# Patient Record
Sex: FEMALE | Race: WHITE | Employment: FULL TIME | URBAN - METROPOLITAN AREA
[De-identification: names, ages, dates, MRNs, and addresses within clinical notes are randomized per-mention and may not be internally consistent; named-entity substitution may affect disease eponyms.]

---

## 2016-01-08 LAB — COLONOSCOPY STUDY: NORMAL

## 2017-05-04 ENCOUNTER — OFFICE VISIT (OUTPATIENT)
Dept: FAMILY MEDICINE CLINIC | Facility: CLINIC | Age: 57
End: 2017-05-04

## 2017-05-04 VITALS
DIASTOLIC BLOOD PRESSURE: 80 MMHG | HEART RATE: 65 BPM | HEIGHT: 64.5 IN | RESPIRATION RATE: 16 BRPM | TEMPERATURE: 98 F | WEIGHT: 161.25 LBS | BODY MASS INDEX: 27.19 KG/M2 | SYSTOLIC BLOOD PRESSURE: 120 MMHG | OXYGEN SATURATION: 99 %

## 2017-05-04 DIAGNOSIS — H92.02 OTALGIA, LEFT: ICD-10-CM

## 2017-05-04 DIAGNOSIS — L30.9 DERMATITIS: Primary | ICD-10-CM

## 2017-05-04 PROCEDURE — 99213 OFFICE O/P EST LOW 20 MIN: CPT | Performed by: PHYSICIAN ASSISTANT

## 2017-05-04 NOTE — PROGRESS NOTES
CC:  Patient presents with:  Ear Pain: left, sx for 24 hours   Other: left elbow itchy, for 5-7 days      HPI: Brodie Ellis presents with complaints of left ear pain for the past few days. She denies any recent cold or ear injury.  Her hearing fells muffled in evi bowels; no abdominal pain or N/V/D/C  Genitourinary:  Normal urination; no hematuria, urgency, or frequency  Skin: See HPI    Physical Exam :  /80 mmHg  Pulse 65  Temp(Src) 98.3 °F (36.8 °C) (Oral)  Resp 16  Ht 64.5\"  Wt 161 lb 4 oz  BMI 27.26 kg/m2 complications from the treatments as a result of today.     Reviewed Past Medical History and   Patient Active Problem List:     Degeneration of cervical intervertebral disc     Migraine headache without aura     Nausea & vomiting     Menopause      No orde

## 2017-08-21 ENCOUNTER — TELEPHONE (OUTPATIENT)
Dept: FAMILY MEDICINE CLINIC | Facility: CLINIC | Age: 57
End: 2017-08-21

## 2017-08-21 NOTE — TELEPHONE ENCOUNTER
Pt called asking for a couple of prescriptions and Immunizations. Pt traveling to [facility] on 09/15/17. Pt would like script for Typhoid pills, Abx for travelers diarrhea, and altitude sickness medications.  Then Pt would like to come in for Hep

## 2017-08-21 NOTE — TELEPHONE ENCOUNTER
Pt requesting a call back on questions she has on Immunizations. .traveling to [facility] from Sept 15th thru Sept 24th (Questions on Thyfoid,Hep A, Altitude med she does want and Blocking Diahrrea med??)

## 2017-08-22 NOTE — TELEPHONE ENCOUNTER
Patient has not been seen by me in over 1 year. She will need a travel consult visit to discuss vaccines, and medication requests. Please schedule visit.

## 2017-08-22 NOTE — TELEPHONE ENCOUNTER
Patient wants to know if she can get the Typhoid and Hep A on 8/29/17 will that work in the time frame needed? She is desperate to have this done. She keeps hearing that it has to be done 2 weeks out.

## 2017-08-22 NOTE — TELEPHONE ENCOUNTER
Typhoid is 4 doses, 1 capsule every other day to be completed 1 week prior to travel. Hepatitis A is a 2 shot series 6 months apart. She will only be able to get the 1st dose before her travel.

## 2017-08-22 NOTE — TELEPHONE ENCOUNTER
TT patient and she stated that she will check her phone schedule and call back to schedule an appt. preferably with TOMMY Lopez. She needs appt prior to 9/15/17.

## 2017-08-23 NOTE — TELEPHONE ENCOUNTER
Called and talked to patient informed her about getting RX for oral typhoid and that she can get Hep A at 8/29 visit

## 2017-08-29 ENCOUNTER — OFFICE VISIT (OUTPATIENT)
Dept: FAMILY MEDICINE CLINIC | Facility: CLINIC | Age: 57
End: 2017-08-29

## 2017-08-29 VITALS
WEIGHT: 158.19 LBS | BODY MASS INDEX: 25.73 KG/M2 | HEART RATE: 64 BPM | RESPIRATION RATE: 14 BRPM | SYSTOLIC BLOOD PRESSURE: 120 MMHG | HEIGHT: 65.6 IN | DIASTOLIC BLOOD PRESSURE: 86 MMHG | TEMPERATURE: 98 F

## 2017-08-29 DIAGNOSIS — Z23 IMMUNIZATION DUE: ICD-10-CM

## 2017-08-29 DIAGNOSIS — Z71.84 COUNSELING ABOUT TRAVEL: ICD-10-CM

## 2017-08-29 DIAGNOSIS — G43.009 MIGRAINE WITHOUT AURA AND WITHOUT STATUS MIGRAINOSUS, NOT INTRACTABLE: Primary | ICD-10-CM

## 2017-08-29 PROCEDURE — 99213 OFFICE O/P EST LOW 20 MIN: CPT | Performed by: NURSE PRACTITIONER

## 2017-08-29 PROCEDURE — 90632 HEPA VACCINE ADULT IM: CPT | Performed by: NURSE PRACTITIONER

## 2017-08-29 PROCEDURE — 90471 IMMUNIZATION ADMIN: CPT | Performed by: NURSE PRACTITIONER

## 2017-08-29 NOTE — PATIENT INSTRUCTIONS
Have a great trip! Return in 6 months for Hepatitis A #2 dose to complete series. Recommend annual flu vaccine.

## 2017-08-29 NOTE — PROGRESS NOTES
Dirk Guerra is a 49year old female. S:  Patient presents today with the following concerns:  · Trip to [facility], leaving in about 2 weeks. Needs immunizations updated. Denies any recent fever, chills or acute illness. Looking forward to vacation.  Request series. Recommend annual flu vaccine. Medication use and side effects reviewed. Return in about 6 months (around 2/28/2018) for Hepatitis A #2 due, RN visit. Patient verbalizes understanding.

## 2018-01-03 ENCOUNTER — TELEPHONE (OUTPATIENT)
Dept: FAMILY MEDICINE CLINIC | Facility: CLINIC | Age: 58
End: 2018-01-03

## 2018-01-03 DIAGNOSIS — Z12.39 BREAST CANCER SCREENING: Primary | ICD-10-CM

## 2018-01-03 NOTE — TELEPHONE ENCOUNTER
Patient is requesting a  Mammogram order to be placed at [facility] in [place], [place]

## 2018-01-03 NOTE — TELEPHONE ENCOUNTER
We have no HX of ordering mammograms for this patient and she has only seen Nicole Haroldo SANDERS will ask Dr Elisabeth Veliz if [place] for bilateral screening mammogram it will need to be Faxed to [facility], [address]  [phone]  (

## 2018-01-15 ENCOUNTER — TELEPHONE (OUTPATIENT)
Dept: FAMILY MEDICINE CLINIC | Facility: CLINIC | Age: 58
End: 2018-01-15

## 2018-01-15 ENCOUNTER — OFFICE VISIT (OUTPATIENT)
Dept: FAMILY MEDICINE CLINIC | Facility: CLINIC | Age: 58
End: 2018-01-15

## 2018-01-15 VITALS
HEART RATE: 60 BPM | DIASTOLIC BLOOD PRESSURE: 72 MMHG | WEIGHT: 156 LBS | BODY MASS INDEX: 25.68 KG/M2 | TEMPERATURE: 98 F | HEIGHT: 65.5 IN | SYSTOLIC BLOOD PRESSURE: 110 MMHG | RESPIRATION RATE: 12 BRPM

## 2018-01-15 DIAGNOSIS — R14.0 BLOATING: ICD-10-CM

## 2018-01-15 DIAGNOSIS — R10.2 PELVIC PAIN: ICD-10-CM

## 2018-01-15 DIAGNOSIS — R42 LIGHT HEADED: ICD-10-CM

## 2018-01-15 DIAGNOSIS — M62.830 BACK MUSCLE SPASM: Primary | ICD-10-CM

## 2018-01-15 PROCEDURE — 99214 OFFICE O/P EST MOD 30 MIN: CPT | Performed by: FAMILY MEDICINE

## 2018-01-15 NOTE — PROGRESS NOTES
Patient presents with:  Low Back Pain: on and off   Dizziness (neurologic)     HPI:   Erik Mccullough is a 56year old female who presents to the office for back issues. Also yesterday had a bout of lightheadedness. This has since resolved.       Does iron CHONDR 1500 COMPLX OR) Take by mouth 3 (three) times daily. Disp:  Rfl:    Multiple Vitamin (DAILY VALUE MULTIVITAMIN) Oral Tab Take 1 tablet by mouth daily. Disp:  Rfl:      No current facility-administered medications on file prior to visit.      Allergie wheezes, rales or rhonchi, symmetric air entry  Heart - normal rate, regular rhythm, normal S1, S2, no murmurs, rubs, clicks or gallops  Abdomen - soft, nontender, nondistended, no masses or organomegaly  No pains or masses in lower abdominal pelvic area. cause.  Mild vague pains periodically, bloating sensation, h/o fibroid and cyst on ovary. Further workup warranted  Will get imaging.   - US PELVIS (TRANSVAGINAL PELVIS) (CPT=76830); Future    3. Bloating  As above.    - US PELVIS (TRANSVAGINAL PELVIS) (CP

## 2018-01-15 NOTE — TELEPHONE ENCOUNTER
Much better today with light headedness was really restless at night not dizzy but felt light headed today feeling much better bit not 100% has back pain on and off for a while now no HX of injury to back but very active and may have pulled something while

## 2018-01-15 NOTE — TELEPHONE ENCOUNTER
Patient called to make appt today and we did with Candy Velásquez PA-C at 2:40 pm she is coming in for lower back pain today and lightheadedness all day yesterday.   Please call

## 2018-01-15 NOTE — TELEPHONE ENCOUNTER
LM on both phones that her appt was double booked with Aurea Jackson PA-C and we need to change it to  at 2:15 pm.  Please call if unable to make appt.

## 2018-01-25 ENCOUNTER — HOSPITAL ENCOUNTER (OUTPATIENT)
Dept: ULTRASOUND IMAGING | Facility: HOSPITAL | Age: 58
Discharge: HOME | End: 2018-01-25
Attending: FAMILY MEDICINE
Payer: COMMERCIAL

## 2018-01-25 ENCOUNTER — HOSPITAL ENCOUNTER (OUTPATIENT)
Dept: MAMMOGRAPHY | Facility: HOSPITAL | Age: 58
Discharge: HOME | End: 2018-01-25
Attending: FAMILY MEDICINE
Payer: COMMERCIAL

## 2018-01-25 DIAGNOSIS — Z12.39 BREAST CANCER SCREENING: ICD-10-CM

## 2018-01-25 DIAGNOSIS — R14.0 BLOATING: ICD-10-CM

## 2018-01-25 DIAGNOSIS — R10.2 PELVIC PAIN: ICD-10-CM

## 2018-01-25 PROCEDURE — 76856 US EXAM PELVIC COMPLETE: CPT | Performed by: FAMILY MEDICINE

## 2018-01-25 PROCEDURE — 77067 SCR MAMMO BI INCL CAD: CPT | Performed by: FAMILY MEDICINE

## 2018-01-25 PROCEDURE — 76830 TRANSVAGINAL US NON-OB: CPT | Performed by: FAMILY MEDICINE

## 2018-01-27 ENCOUNTER — PATIENT MESSAGE (OUTPATIENT)
Dept: FAMILY MEDICINE CLINIC | Facility: CLINIC | Age: 58
End: 2018-01-27

## 2018-01-29 NOTE — TELEPHONE ENCOUNTER
From: Cristy Murray  To: Macey Lozada MD  Sent: 1/27/2018 4:14 PM CST  Subject: Other    Dr. Geovany Little,    This is a follow up to the message I sent you this morning.  I sent the same message to my OB/GYN DrThelma (Dr. Abby Maldonado with [address]

## 2018-02-12 ENCOUNTER — CHARTING TRANS (OUTPATIENT)
Dept: OTHER | Age: 58
End: 2018-02-12

## 2018-02-12 ASSESSMENT — PAIN SCALES - GENERAL: PAINLEVEL_OUTOF10: 2

## 2018-10-09 ENCOUNTER — TELEPHONE (OUTPATIENT)
Dept: FAMILY MEDICINE CLINIC | Facility: CLINIC | Age: 58
End: 2018-10-09

## 2018-10-09 NOTE — TELEPHONE ENCOUNTER
Medical Records Release received from pt requesting all of her Medical Records except physical form 5/13/106 faxed to Dr Charli Anderson December at [phone] . All Medical Records located in [facility] in which request was sent to [address] [place] [address] STAT.  Pt requesting to pick u

## 2018-10-30 ENCOUNTER — CHARTING TRANS (OUTPATIENT)
Dept: OTHER | Age: 58
End: 2018-10-30

## 2018-11-14 ENCOUNTER — LAB SERVICES (OUTPATIENT)
Dept: OTHER | Age: 58
End: 2018-11-14

## 2018-11-14 LAB
ALBUMIN SERPL-MCNC: 4 G/DL (ref 3.6–5.1)
ALBUMIN/GLOB SERPL: 1.4 {RATIO} (ref 1–2.4)
ALP SERPL-CCNC: 79 UNITS/L (ref 45–117)
ALT SERPL-CCNC: 22 UNITS/L
ANION GAP SERPL CALC-SCNC: 10 MMOL/L (ref 10–20)
AST SERPL-CCNC: 10 UNITS/L
BASOPHILS # BLD: 0.1 K/MCL (ref 0–0.3)
BASOPHILS NFR BLD: 1 %
BILIRUB SERPL-MCNC: 0.6 MG/DL (ref 0.2–1)
BUN SERPL-MCNC: 20 MG/DL (ref 6–20)
BUN/CREAT SERPL: 25 (ref 7–25)
CALCIUM SERPL-MCNC: 9.5 MG/DL (ref 8.4–10.2)
CHLORIDE SERPL-SCNC: 108 MMOL/L (ref 98–107)
CHOLEST SERPL-MCNC: 176 MG/DL
CHOLEST/HDLC SERPL: 2.8 {RATIO}
CO2 SERPL-SCNC: 30 MMOL/L (ref 21–32)
CREAT SERPL-MCNC: 0.8 MG/DL (ref 0.51–0.95)
DIFFERENTIAL METHOD BLD: NORMAL
EOSINOPHIL # BLD: 0.1 K/MCL (ref 0.1–0.5)
EOSINOPHIL NFR BLD: 2 %
ERYTHROCYTE [DISTWIDTH] IN BLOOD: 13.2 % (ref 11–15)
GLOBULIN SER-MCNC: 2.8 G/DL (ref 2–4)
GLUCOSE SERPL-MCNC: 75 MG/DL (ref 65–99)
HCT VFR BLD CALC: 40.5 % (ref 36–46.5)
HDLC SERPL-MCNC: 63 MG/DL
HGB BLD-MCNC: 13.2 G/DL (ref 12–15.5)
IMM GRANULOCYTES # BLD AUTO: 0 K/MCL (ref 0–0.2)
IMM GRANULOCYTES NFR BLD: 0 %
LDLC SERPL CALC-MCNC: 98 MG/DL
LENGTH OF FAST TIME PATIENT: ABNORMAL HRS
LENGTH OF FAST TIME PATIENT: ABNORMAL HRS
LYMPHOCYTES # BLD: 1.7 K/MCL (ref 1–4)
LYMPHOCYTES NFR BLD: 36 %
MCH RBC QN AUTO: 30.4 PG (ref 26–34)
MCHC RBC AUTO-ENTMCNC: 32.6 G/DL (ref 32–36.5)
MCV RBC AUTO: 93.3 FL (ref 78–100)
MONOCYTES # BLD: 0.4 K/MCL (ref 0.3–0.9)
MONOCYTES NFR BLD: 9 %
NEUTROPHILS # BLD: 2.4 K/MCL (ref 1.8–7.7)
NEUTROPHILS NFR BLD: 52 %
NONHDLC SERPL-MCNC: 113 MG/DL
NRBC (NRBCRE): 0 /100 WBC
PLATELET # BLD: 172 K/MCL (ref 140–450)
POTASSIUM SERPL-SCNC: 4.5 MMOL/L (ref 3.4–5.1)
PROT SERPL-MCNC: 6.8 G/DL (ref 6.4–8.2)
RBC # BLD: 4.34 MIL/MCL (ref 4–5.2)
SODIUM SERPL-SCNC: 143 MMOL/L (ref 135–145)
TRIGL SERPL-MCNC: 73 MG/DL
TSH SERPL-ACNC: 2.41 MCUNITS/ML (ref 0.35–5)
WBC # BLD: 4.6 K/MCL (ref 4.2–11)

## 2018-11-15 ENCOUNTER — CHARTING TRANS (OUTPATIENT)
Dept: OTHER | Age: 58
End: 2018-11-15

## 2018-11-27 VITALS
WEIGHT: 155.4 LBS | SYSTOLIC BLOOD PRESSURE: 138 MMHG | BODY MASS INDEX: 24.98 KG/M2 | DIASTOLIC BLOOD PRESSURE: 80 MMHG | HEIGHT: 66 IN | HEART RATE: 45 BPM

## 2018-12-01 ENCOUNTER — PRIOR ORIGINAL RECORDS (OUTPATIENT)
Dept: HEALTH INFORMATION MANAGEMENT | Facility: OTHER | Age: 58
End: 2018-12-01

## 2019-01-07 ENCOUNTER — APPOINTMENT (OUTPATIENT)
Dept: GENERAL RADIOLOGY | Facility: HOSPITAL | Age: 59
End: 2019-01-07
Attending: EMERGENCY MEDICINE
Payer: COMMERCIAL

## 2019-01-07 ENCOUNTER — HOSPITAL ENCOUNTER (OUTPATIENT)
Facility: HOSPITAL | Age: 59
Setting detail: OBSERVATION
Discharge: HOME | End: 2019-01-08
Attending: EMERGENCY MEDICINE | Admitting: STUDENT IN AN ORGANIZED HEALTH CARE EDUCATION/TRAINING PROGRAM
Payer: COMMERCIAL

## 2019-01-07 ENCOUNTER — TELEPHONE (OUTPATIENT)
Dept: SCHEDULING | Age: 59
End: 2019-01-07

## 2019-01-07 DIAGNOSIS — M54.12 CERVICAL RADICULOPATHY: ICD-10-CM

## 2019-01-07 DIAGNOSIS — S29.019A STRAIN OF THORACIC REGION, INITIAL ENCOUNTER: ICD-10-CM

## 2019-01-07 DIAGNOSIS — S16.1XXA STRAIN OF NECK MUSCLE, INITIAL ENCOUNTER: Primary | ICD-10-CM

## 2019-01-07 LAB
ALBUMIN SERPL-MCNC: 4.1 G/DL (ref 3.1–4.5)
ALBUMIN/GLOB SERPL: 1.1 {RATIO} (ref 1–2)
ALP LIVER SERPL-CCNC: 85 U/L (ref 46–118)
ALT SERPL-CCNC: 22 U/L (ref 14–54)
ANION GAP SERPL CALC-SCNC: 4 MMOL/L (ref 0–18)
AST SERPL-CCNC: 26 U/L (ref 15–41)
BASOPHILS # BLD AUTO: 0.04 X10(3) UL (ref 0–0.1)
BASOPHILS NFR BLD AUTO: 0.4 %
BILIRUB SERPL-MCNC: 0.7 MG/DL (ref 0.1–2)
BUN BLD-MCNC: 12 MG/DL (ref 8–20)
BUN/CREAT SERPL: 15 (ref 10–20)
CALCIUM BLD-MCNC: 10.4 MG/DL (ref 8.3–10.3)
CHLORIDE SERPL-SCNC: 109 MMOL/L (ref 101–111)
CO2 SERPL-SCNC: 26 MMOL/L (ref 22–32)
CREAT BLD-MCNC: 0.8 MG/DL (ref 0.55–1.02)
EOSINOPHIL # BLD AUTO: 0.03 X10(3) UL (ref 0–0.3)
EOSINOPHIL NFR BLD AUTO: 0.3 %
ERYTHROCYTE [DISTWIDTH] IN BLOOD BY AUTOMATED COUNT: 12.7 % (ref 11.5–16)
GLOBULIN PLAS-MCNC: 3.6 G/DL (ref 2.8–4.4)
GLUCOSE BLD-MCNC: 99 MG/DL (ref 70–99)
HCT VFR BLD AUTO: 43.1 % (ref 34–50)
HGB BLD-MCNC: 14.9 G/DL (ref 12–16)
IMMATURE GRANULOCYTE COUNT: 0.03 X10(3) UL (ref 0–1)
IMMATURE GRANULOCYTE RATIO %: 0.3 %
LYMPHOCYTES # BLD AUTO: 1.01 X10(3) UL (ref 0.9–4)
LYMPHOCYTES NFR BLD AUTO: 9.9 %
M PROTEIN MFR SERPL ELPH: 7.7 G/DL (ref 6.4–8.2)
MCH RBC QN AUTO: 30.9 PG (ref 27–33.2)
MCHC RBC AUTO-ENTMCNC: 34.6 G/DL (ref 31–37)
MCV RBC AUTO: 89.4 FL (ref 81–100)
MONOCYTES # BLD AUTO: 0.63 X10(3) UL (ref 0.1–1)
MONOCYTES NFR BLD AUTO: 6.2 %
NEUTROPHIL ABS PRELIM: 8.46 X10 (3) UL (ref 1.3–6.7)
NEUTROPHILS # BLD AUTO: 8.46 X10(3) UL (ref 1.3–6.7)
NEUTROPHILS NFR BLD AUTO: 82.9 %
OSMOLALITY SERPL CALC.SUM OF ELEC: 288 MOSM/KG (ref 275–295)
PLATELET # BLD AUTO: 183 10(3)UL (ref 150–450)
POTASSIUM SERPL-SCNC: 5.1 MMOL/L (ref 3.6–5.1)
RBC # BLD AUTO: 4.82 X10(6)UL (ref 3.8–5.1)
RED CELL DISTRIBUTION WIDTH-SD: 41.8 FL (ref 35.1–46.3)
SODIUM SERPL-SCNC: 139 MMOL/L (ref 136–144)
WBC # BLD AUTO: 10.2 X10(3) UL (ref 4–13)

## 2019-01-07 PROCEDURE — 72040 X-RAY EXAM NECK SPINE 2-3 VW: CPT | Performed by: EMERGENCY MEDICINE

## 2019-01-07 PROCEDURE — 99219 INITIAL OBSERVATION CARE,LEVL II: CPT | Performed by: STUDENT IN AN ORGANIZED HEALTH CARE EDUCATION/TRAINING PROGRAM

## 2019-01-07 PROCEDURE — 72072 X-RAY EXAM THORAC SPINE 3VWS: CPT | Performed by: EMERGENCY MEDICINE

## 2019-01-07 RX ORDER — ACETAMINOPHEN 325 MG/1
650 TABLET ORAL EVERY 6 HOURS PRN
Status: DISCONTINUED | OUTPATIENT
Start: 2019-01-07 | End: 2019-01-08

## 2019-01-07 RX ORDER — TRAMADOL HYDROCHLORIDE 50 MG/1
50 TABLET ORAL EVERY 6 HOURS PRN
Status: DISCONTINUED | OUTPATIENT
Start: 2019-01-07 | End: 2019-01-08

## 2019-01-07 RX ORDER — HYDROMORPHONE HYDROCHLORIDE 1 MG/ML
0.5 INJECTION, SOLUTION INTRAMUSCULAR; INTRAVENOUS; SUBCUTANEOUS EVERY 30 MIN PRN
Status: DISCONTINUED | OUTPATIENT
Start: 2019-01-07 | End: 2019-01-07 | Stop reason: HOSPADM

## 2019-01-07 RX ORDER — POLYETHYLENE GLYCOL 3350 17 G/17G
17 POWDER, FOR SOLUTION ORAL DAILY PRN
Status: DISCONTINUED | OUTPATIENT
Start: 2019-01-07 | End: 2019-01-08

## 2019-01-07 RX ORDER — CYCLOBENZAPRINE HCL 5 MG
5 TABLET ORAL 3 TIMES DAILY PRN
Status: DISCONTINUED | OUTPATIENT
Start: 2019-01-07 | End: 2019-01-08 | Stop reason: DRUGHIGH

## 2019-01-07 RX ORDER — METHYLPREDNISOLONE 4 MG/1
12 TABLET ORAL
Status: COMPLETED | OUTPATIENT
Start: 2019-01-07 | End: 2019-01-07

## 2019-01-07 RX ORDER — METHYLPREDNISOLONE 4 MG/1
4 TABLET ORAL 2 TIMES DAILY
Status: DISCONTINUED | OUTPATIENT
Start: 2019-01-11 | End: 2019-01-08

## 2019-01-07 RX ORDER — SODIUM CHLORIDE 9 MG/ML
125 INJECTION, SOLUTION INTRAVENOUS CONTINUOUS
Status: DISCONTINUED | OUTPATIENT
Start: 2019-01-07 | End: 2019-01-07

## 2019-01-07 RX ORDER — METHYLPREDNISOLONE 4 MG/1
4 TABLET ORAL
Status: DISCONTINUED | OUTPATIENT
Start: 2019-01-09 | End: 2019-01-08

## 2019-01-07 RX ORDER — METHYLPREDNISOLONE 4 MG/1
4 TABLET ORAL
Status: DISCONTINUED | OUTPATIENT
Start: 2019-01-10 | End: 2019-01-08

## 2019-01-07 RX ORDER — DEXAMETHASONE SODIUM PHOSPHATE 4 MG/ML
10 VIAL (ML) INJECTION ONCE
Status: COMPLETED | OUTPATIENT
Start: 2019-01-07 | End: 2019-01-07

## 2019-01-07 RX ORDER — SODIUM CHLORIDE 9 MG/ML
INJECTION, SOLUTION INTRAVENOUS CONTINUOUS
Status: DISCONTINUED | OUTPATIENT
Start: 2019-01-07 | End: 2019-01-08

## 2019-01-07 RX ORDER — BISACODYL 10 MG
10 SUPPOSITORY, RECTAL RECTAL
Status: DISCONTINUED | OUTPATIENT
Start: 2019-01-07 | End: 2019-01-08

## 2019-01-07 RX ORDER — METHYLPREDNISOLONE 4 MG/1
4 TABLET ORAL
Status: DISCONTINUED | OUTPATIENT
Start: 2019-01-12 | End: 2019-01-08

## 2019-01-07 RX ORDER — MORPHINE SULFATE 4 MG/ML
1 INJECTION, SOLUTION INTRAMUSCULAR; INTRAVENOUS EVERY 4 HOURS PRN
Status: DISCONTINUED | OUTPATIENT
Start: 2019-01-07 | End: 2019-01-08

## 2019-01-07 RX ORDER — DIAZEPAM 5 MG/ML
5 INJECTION, SOLUTION INTRAMUSCULAR; INTRAVENOUS ONCE
Status: COMPLETED | OUTPATIENT
Start: 2019-01-07 | End: 2019-01-07

## 2019-01-07 RX ORDER — SODIUM PHOSPHATE, DIBASIC AND SODIUM PHOSPHATE, MONOBASIC 7; 19 G/133ML; G/133ML
1 ENEMA RECTAL ONCE AS NEEDED
Status: DISCONTINUED | OUTPATIENT
Start: 2019-01-07 | End: 2019-01-08

## 2019-01-07 RX ORDER — METHYLPREDNISOLONE 4 MG/1
TABLET ORAL
Qty: 1 PACKAGE | Refills: 0 | Status: SHIPPED | OUTPATIENT
Start: 2019-01-07 | End: 2019-01-08

## 2019-01-07 RX ORDER — TRAMADOL HYDROCHLORIDE 50 MG/1
TABLET ORAL EVERY 4 HOURS PRN
Qty: 10 TABLET | Refills: 0 | Status: SHIPPED | OUTPATIENT
Start: 2019-01-07 | End: 2019-01-08

## 2019-01-07 RX ORDER — TRAMADOL HYDROCHLORIDE 50 MG/1
TABLET ORAL EVERY 6 HOURS PRN
Status: DISCONTINUED | OUTPATIENT
Start: 2019-01-07 | End: 2019-01-07 | Stop reason: SDUPTHER

## 2019-01-07 RX ORDER — ENOXAPARIN SODIUM 100 MG/ML
40 INJECTION SUBCUTANEOUS NIGHTLY
Status: DISCONTINUED | OUTPATIENT
Start: 2019-01-07 | End: 2019-01-08

## 2019-01-07 RX ORDER — TRAMADOL HYDROCHLORIDE 50 MG/1
100 TABLET ORAL EVERY 6 HOURS PRN
Status: DISCONTINUED | OUTPATIENT
Start: 2019-01-07 | End: 2019-01-08

## 2019-01-07 RX ORDER — NAPROXEN 500 MG/1
500 TABLET ORAL 2 TIMES DAILY PRN
Qty: 20 TABLET | Refills: 0 | Status: SHIPPED | OUTPATIENT
Start: 2019-01-07 | End: 2019-01-08

## 2019-01-07 RX ORDER — ONDANSETRON 2 MG/ML
4 INJECTION INTRAMUSCULAR; INTRAVENOUS EVERY 6 HOURS PRN
Status: DISCONTINUED | OUTPATIENT
Start: 2019-01-07 | End: 2019-01-08

## 2019-01-07 RX ORDER — METOCLOPRAMIDE HYDROCHLORIDE 5 MG/ML
10 INJECTION INTRAMUSCULAR; INTRAVENOUS EVERY 8 HOURS PRN
Status: DISCONTINUED | OUTPATIENT
Start: 2019-01-07 | End: 2019-01-08

## 2019-01-07 RX ORDER — ONDANSETRON 2 MG/ML
4 INJECTION INTRAMUSCULAR; INTRAVENOUS ONCE
Status: DISCONTINUED | OUTPATIENT
Start: 2019-01-07 | End: 2019-01-08

## 2019-01-07 RX ORDER — METHYLPREDNISOLONE 4 MG/1
4 TABLET ORAL
Status: DISCONTINUED | OUTPATIENT
Start: 2019-01-08 | End: 2019-01-08

## 2019-01-07 RX ORDER — MUSCLE RUB CREAM 100; 150 MG/G; MG/G
CREAM TOPICAL 3 TIMES DAILY PRN
Status: DISCONTINUED | OUTPATIENT
Start: 2019-01-07 | End: 2019-01-08

## 2019-01-07 RX ORDER — SODIUM CHLORIDE 9 MG/ML
125 INJECTION, SOLUTION INTRAVENOUS CONTINUOUS
Status: DISCONTINUED | OUTPATIENT
Start: 2019-01-07 | End: 2019-01-08

## 2019-01-07 RX ORDER — ASPIRIN 81 MG/1
TABLET, CHEWABLE ORAL
Status: DISPENSED
Start: 2019-01-07 | End: 2019-01-08

## 2019-01-07 RX ORDER — DIAZEPAM 5 MG/1
5 TABLET ORAL 3 TIMES DAILY PRN
Qty: 20 TABLET | Refills: 0 | Status: SHIPPED | OUTPATIENT
Start: 2019-01-07 | End: 2019-01-08

## 2019-01-07 RX ORDER — KETOROLAC TROMETHAMINE 30 MG/ML
30 INJECTION, SOLUTION INTRAMUSCULAR; INTRAVENOUS ONCE
Status: COMPLETED | OUTPATIENT
Start: 2019-01-07 | End: 2019-01-07

## 2019-01-07 RX ORDER — METHYLPREDNISOLONE 4 MG/1
8 TABLET ORAL
Status: DISCONTINUED | OUTPATIENT
Start: 2019-01-08 | End: 2019-01-08

## 2019-01-07 NOTE — ED NOTES
Patient sat up, reported feeling really dizzy and stated \"I can't do this\" patient laid back down and reported feeling nauseas. Patient did have yellow colored emesis. MD notified. Zofran ordered for patient.

## 2019-01-07 NOTE — ED NOTES
Patient remains updated with results and plan of care.  at bedside and also provided with update. Patient given additional cold packs at this time and ace wrap to help wrap around left shoulder. Lying on side, reports pain decreasing slightly.  No ac

## 2019-01-07 NOTE — ED NOTES
Report called to Dory Medina RN at this time. Patient observed laying on side with  and daughters at bedside. Patient to be transported by Angelique Myles PCT. No distress observed. Cold packs in place. Belongings with . VSS.  Pain unchanged at rest, repor

## 2019-01-07 NOTE — ED INITIAL ASSESSMENT (HPI)
PT c/o left shoulder pain that radiates to back and down left arm that has been increasing in intensity for past 48 hours. PT denies any falls or traumas. Pt rpt she ran a race in Sept and began to notice it at that time only mild.  PT received 4-baby ASA e

## 2019-01-07 NOTE — ED PROVIDER NOTES
Patient Seen in: [facility] Emergency Department    History   Patient presents with:  Upper Extremity Injury (musculoskeletal)    Stated Complaint: Left Shoulder Pain    HPI    16-year-old female presents to the emergency department with complaints All other systems reviewed and negative except as noted above.     Physical Exam     ED Triage Vitals [01/07/19 1208]   /69   Pulse 64   Resp 15   Temp 98.3 °F (36.8 °C)   Temp src Temporal   SpO2 99 %   O2 Device None (Room air)       Current:/ The following orders were created for panel order CBC WITH DIFFERENTIAL WITH PLATELET.   Procedure                               Abnormality         Status                     ---------                               -----------         [facility] [id number] [phone]  Call  choose option 2 for neurosurgery or pain follow up        Medications Prescribed:  Current Discharge Medication List    START taking these medications    methylPREDNISolone 4 MG Oral Tablet Therapy Pack

## 2019-01-07 NOTE — ED NOTES
Patient has returned from xray at this time. Informed by radiology staff that she was unable to tolerate position for testing. MD notified of this. Additional meds to be ordered. Patient has cold pack in place to left shoulder blade area.

## 2019-01-07 NOTE — ED NOTES
Family requesting more ice packs, more ice packs provided. Spoke with patient and family. Per patient she is not quite ready to try to sit up yet at this time. Offered prn pain medication and offered to assist patient to sit up.  Patient expressed she will

## 2019-01-08 VITALS
HEIGHT: 66 IN | OXYGEN SATURATION: 100 % | SYSTOLIC BLOOD PRESSURE: 161 MMHG | TEMPERATURE: 99 F | WEIGHT: 153 LBS | HEART RATE: 61 BPM | DIASTOLIC BLOOD PRESSURE: 95 MMHG | BODY MASS INDEX: 24.59 KG/M2 | RESPIRATION RATE: 18 BRPM

## 2019-01-08 PROCEDURE — 99217 OBSERVATION CARE DISCHARGE: CPT | Performed by: HOSPITALIST

## 2019-01-08 RX ORDER — NAPROXEN 500 MG/1
500 TABLET ORAL 2 TIMES DAILY PRN
Qty: 20 TABLET | Refills: 0 | Status: SHIPPED | OUTPATIENT
Start: 2019-01-08

## 2019-01-08 RX ORDER — CYCLOBENZAPRINE HCL 10 MG
10 TABLET ORAL 3 TIMES DAILY PRN
Status: DISCONTINUED | OUTPATIENT
Start: 2019-01-08 | End: 2019-01-08

## 2019-01-08 RX ORDER — CYCLOBENZAPRINE HCL 10 MG
10 TABLET ORAL 3 TIMES DAILY PRN
Qty: 15 TABLET | Refills: 0 | Status: SHIPPED | OUTPATIENT
Start: 2019-01-08

## 2019-01-08 RX ORDER — NAPROXEN 500 MG/1
500 TABLET ORAL EVERY 12 HOURS PRN
Status: DISCONTINUED | OUTPATIENT
Start: 2019-01-08 | End: 2019-01-08

## 2019-01-08 RX ORDER — METHYLPREDNISOLONE 4 MG/1
TABLET ORAL
Qty: 1 PACKAGE | Refills: 0 | Status: SHIPPED | OUTPATIENT
Start: 2019-01-08 | End: 2019-01-13

## 2019-01-08 NOTE — PLAN OF CARE
PAIN - ADULT    • Verbalizes/displays adequate comfort level or patient's stated pain goal Progressing        Patient/Family Goals    • Patient/Family Long Term Goal Progressing    • Patient/Family Short Term Goal Progressing        PT.  STARTED ON MEDROL D

## 2019-01-08 NOTE — PHYSICAL THERAPY NOTE
PHYSICAL THERAPY QUICK EVALUATION - INPATIENT    Room Number: 364/364-A  Evaluation Date: 1/8/2019  Presenting Problem: (shoulder pain)  Physician Order: PT Eval and Treat    Problem List  Principal Problem:    Strain of neck muscle, initial encounter  A help from another person does the patient currently need. ..   -   Moving to and from a bed to a chair (including a wheelchair)?: None   -   Need to walk in hospital room?: None   -   Climbing 3-5 steps with a railing?: None       AM-PAC Score:  Raw Score: able to transfer Safely and independently   Patient able to ambulate on level surfaces Safely and independently

## 2019-01-08 NOTE — PROGRESS NOTES
[facility] HOSPITAL[facility]  DISCHARGE SUMMARY     Prince Moeller Patient Status:  Observation    1960 MRN [id number]   [facility] 3SW-A Attending Rodriguez Atkins MD   Hosp Day # 0 PCP Kathy Malone MD     Date of Admission: 2019  Date =====  CONCLUSION:  Scoliotic and degenerative changes. FINDINGS:    There are 12 rib-bearing thoracic vertebral bodies. There is mild apex right low thoracic curvature. There is straightening of the expected kyphosis. No subluxation.   [facility] [phone]  Call  choose option 2 for neurosurgery or pain follow up    Appointments for Next 30 Days 1/8/2019 - 2/7/2019    None          Vital signs:  Temp:  [97.5 °F (36.4 °C)-98

## 2019-01-08 NOTE — PLAN OF CARE
PAIN - ADULT    • Verbalizes/displays adequate comfort level or patient's stated pain goal Progressing        Patient/Family Goals    • Patient/Family Short Term Goal Progressing        A/o x4. Denies numbness, tingling. Moves all extremities.   States le

## 2019-01-08 NOTE — PROGRESS NOTES
ASSUMED PT.S CARE. VSS. PT.S FRIEND AT BEDSIDE. Onur Lopez IV INFUSING WELL. PT. AND PT.S FRIEND UPDATED ON PLAN OF CARE . PT. INSTRUCTED TO CALL DON'T FALL. CALL LIGHT WITHIN REACH. BED LOCKED IN LOW POSITION. SAFETY MEASURES IN PLACE.

## 2019-01-08 NOTE — H&P
[facility]PITAL[facility]  History and Physical     Sara Mishra Patient Status:  Emergency    1960 MRN [id number]   Location [address] Attending No att. providers found   Hosp Day # 0 PCP Karoline Murary MD     Chief Compla daily. Disp:  Rfl:        Review of Systems:   A comprehensive 14 point review of systems was completed. Pertinent positives and negatives noted in the HPI.     Physical Exam:    BP (!) 167/81   Pulse 56   Temp 98.3 °F (36.8 °C) (Temporal)   Resp 18   Ht from poor PO intake   2. IVF  3.  Suppotive care     Quality:  · DVT Prophylaxis: Lovenox  · CODE status: full  · Ling: no    Plan of care discussed with pt, pt spouse and dtrs x2    Andre Fairchild MD  1/7/2019

## 2019-01-10 NOTE — DISCHARGE SUMMARY
[facility] HOSPITAL[facility]  DISCHARGE SUMMARY            Una Ball Patient Status:  Observation    1960 MRN [id number]   [facility] 3SW-A Attending Brandon Kang MD   Hosp Day # 0 PCP Jeb Perdomo MD      Date of Admission:  swelling.     =====  CONCLUSION:  Scoliotic and degenerative changes.     FINDINGS:    There are 12 rib-bearing thoracic vertebral bodies.  There is mild apex right low thoracic curvature.  There is straightening of the expected kyphosis.  No subluxation. No opioids given      Follow-up appointment:   [address] [id number] [phone]  Call  choose option 2 for neurosurgery or pain follow up         Appointments for Ne

## 2019-01-11 ENCOUNTER — TELEPHONE (OUTPATIENT)
Dept: SCHEDULING | Age: 59
End: 2019-01-11

## 2019-01-15 RX ORDER — LORATADINE 10 MG/1
TABLET ORAL
COMMUNITY
End: 2019-01-18 | Stop reason: SDUPTHER

## 2019-01-15 RX ORDER — QUINIDINE GLUCONATE 324 MG
TABLET, EXTENDED RELEASE ORAL
COMMUNITY

## 2019-01-16 NOTE — PAYOR COMM NOTE
--------------  CONTINUED STAY REVIEW    Payor: [facility] (NON CONTRACTED IPA)  Subscriber #:  [id number]  Authorization Number: N/A    Admit date:1/7/19  Admitting Physician: Juan Francisco Aden MD  Attending Physician:  No att. providers found  Primary C

## 2019-01-17 ENCOUNTER — APPOINTMENT (OUTPATIENT)
Dept: INTERNAL MEDICINE | Age: 59
End: 2019-01-17

## 2019-01-18 ENCOUNTER — OFFICE VISIT (OUTPATIENT)
Dept: INTERNAL MEDICINE | Age: 59
End: 2019-01-18

## 2019-01-18 VITALS
SYSTOLIC BLOOD PRESSURE: 154 MMHG | HEART RATE: 64 BPM | BODY MASS INDEX: 26.88 KG/M2 | TEMPERATURE: 96.1 F | WEIGHT: 164 LBS | DIASTOLIC BLOOD PRESSURE: 96 MMHG

## 2019-01-18 DIAGNOSIS — M54.12 CERVICAL RADICULOPATHY: Primary | ICD-10-CM

## 2019-01-18 DIAGNOSIS — I10 ESSENTIAL HYPERTENSION: ICD-10-CM

## 2019-01-18 PROBLEM — S16.1XXA STRAIN OF NECK MUSCLE: Status: ACTIVE | Noted: 2019-01-07

## 2019-01-18 PROBLEM — S29.019A STRAIN OF THORACIC REGION: Status: ACTIVE | Noted: 2019-01-07

## 2019-01-18 PROCEDURE — 3080F DIAST BP >= 90 MM HG: CPT | Performed by: INTERNAL MEDICINE

## 2019-01-18 PROCEDURE — 3077F SYST BP >= 140 MM HG: CPT | Performed by: INTERNAL MEDICINE

## 2019-01-18 PROCEDURE — 99213 OFFICE O/P EST LOW 20 MIN: CPT | Performed by: INTERNAL MEDICINE

## 2019-01-18 RX ORDER — LISINOPRIL 10 MG/1
10 TABLET ORAL DAILY
Qty: 90 TABLET | Refills: 3 | Status: SHIPPED | OUTPATIENT
Start: 2019-01-18 | End: 2019-05-02 | Stop reason: SDUPTHER

## 2019-01-18 RX ORDER — NAPROXEN 500 MG/1
500 TABLET ORAL
COMMUNITY
Start: 2019-01-08 | End: 2019-01-18 | Stop reason: ALTCHOICE

## 2019-01-18 RX ORDER — CYCLOBENZAPRINE HCL 10 MG
10 TABLET ORAL
COMMUNITY
Start: 2019-01-08 | End: 2019-01-18 | Stop reason: ALTCHOICE

## 2019-01-18 RX ORDER — LORATADINE 10 MG/1
10 TABLET ORAL
COMMUNITY

## 2019-01-18 ASSESSMENT — ENCOUNTER SYMPTOMS
ABDOMINAL PAIN: 0
DIZZINESS: 0
NAUSEA: 0
HEADACHES: 0
DIARRHEA: 0
SHORTNESS OF BREATH: 0
CHILLS: 0
VOMITING: 0
BACK PAIN: 0
FEVER: 0

## 2019-01-18 ASSESSMENT — PATIENT HEALTH QUESTIONNAIRE - PHQ9
2. FEELING DOWN, DEPRESSED OR HOPELESS: NOT AT ALL
SUM OF ALL RESPONSES TO PHQ9 QUESTIONS 1 AND 2: 0
1. LITTLE INTEREST OR PLEASURE IN DOING THINGS: NOT AT ALL

## 2019-02-01 ENCOUNTER — HOSPITAL (OUTPATIENT)
Dept: OTHER | Age: 59
End: 2019-02-01
Attending: OBSTETRICS & GYNECOLOGY

## 2019-02-04 ENCOUNTER — HOSPITAL (OUTPATIENT)
Dept: OTHER | Age: 59
End: 2019-02-04
Attending: INTERNAL MEDICINE

## 2019-02-08 ENCOUNTER — LAB SERVICES (OUTPATIENT)
Dept: LAB | Age: 59
End: 2019-02-08

## 2019-02-08 DIAGNOSIS — I10 ESSENTIAL HYPERTENSION: ICD-10-CM

## 2019-02-08 LAB
ANION GAP SERPL CALC-SCNC: 8 MMOL/L (ref 10–20)
BUN SERPL-MCNC: 20 MG/DL (ref 6–20)
BUN/CREAT SERPL: 29 (ref 7–25)
CALCIUM SERPL-MCNC: 9.5 MG/DL (ref 8.4–10.2)
CHLORIDE SERPL-SCNC: 110 MMOL/L (ref 98–107)
CO2 SERPL-SCNC: 28 MMOL/L (ref 21–32)
CREAT SERPL-MCNC: 0.7 MG/DL (ref 0.51–0.95)
FASTING STATUS PATIENT QL REPORTED: 12 HRS
GLUCOSE SERPL-MCNC: 78 MG/DL (ref 65–99)
POTASSIUM SERPL-SCNC: 4.3 MMOL/L (ref 3.4–5.1)
SODIUM SERPL-SCNC: 142 MMOL/L (ref 135–145)

## 2019-02-08 PROCEDURE — 80048 BASIC METABOLIC PNL TOTAL CA: CPT | Performed by: INTERNAL MEDICINE

## 2019-02-08 PROCEDURE — 36415 COLL VENOUS BLD VENIPUNCTURE: CPT | Performed by: INTERNAL MEDICINE

## 2019-03-01 ENCOUNTER — HOSPITAL (OUTPATIENT)
Dept: OTHER | Age: 59
End: 2019-03-01
Attending: INTERNAL MEDICINE

## 2019-03-06 VITALS
SYSTOLIC BLOOD PRESSURE: 168 MMHG | HEART RATE: 47 BPM | OXYGEN SATURATION: 100 % | DIASTOLIC BLOOD PRESSURE: 94 MMHG | RESPIRATION RATE: 14 BRPM | TEMPERATURE: 98.3 F

## 2019-04-01 ENCOUNTER — HOSPITAL (OUTPATIENT)
Dept: OTHER | Age: 59
End: 2019-04-01
Attending: INTERNAL MEDICINE

## 2019-04-22 ENCOUNTER — TELEPHONE (OUTPATIENT)
Dept: SCHEDULING | Age: 59
End: 2019-04-22

## 2019-05-01 ENCOUNTER — TELEPHONE (OUTPATIENT)
Dept: SCHEDULING | Age: 59
End: 2019-05-01

## 2019-05-02 ENCOUNTER — OFFICE VISIT (OUTPATIENT)
Dept: INTERNAL MEDICINE | Age: 59
End: 2019-05-02

## 2019-05-02 VITALS — DIASTOLIC BLOOD PRESSURE: 73 MMHG | SYSTOLIC BLOOD PRESSURE: 117 MMHG | HEART RATE: 54 BPM | TEMPERATURE: 97 F

## 2019-05-02 DIAGNOSIS — I10 ESSENTIAL HYPERTENSION: ICD-10-CM

## 2019-05-02 DIAGNOSIS — J30.2 SEASONAL ALLERGIC RHINITIS, UNSPECIFIED TRIGGER: Primary | ICD-10-CM

## 2019-05-02 PROCEDURE — 99213 OFFICE O/P EST LOW 20 MIN: CPT | Performed by: INTERNAL MEDICINE

## 2019-05-02 PROCEDURE — 3078F DIAST BP <80 MM HG: CPT | Performed by: INTERNAL MEDICINE

## 2019-05-02 PROCEDURE — 3074F SYST BP LT 130 MM HG: CPT | Performed by: INTERNAL MEDICINE

## 2019-05-02 RX ORDER — FLUTICASONE PROPIONATE 50 MCG
2 SPRAY, SUSPENSION (ML) NASAL DAILY
Qty: 16 G | Refills: 12 | Status: SHIPPED | OUTPATIENT
Start: 2019-05-02 | End: 2021-02-01 | Stop reason: ALTCHOICE

## 2019-05-02 RX ORDER — LISINOPRIL 10 MG/1
10 TABLET ORAL DAILY
Qty: 90 TABLET | Refills: 3 | Status: SHIPPED | OUTPATIENT
Start: 2019-05-02 | End: 2019-12-09 | Stop reason: DRUGHIGH

## 2019-05-02 ASSESSMENT — ENCOUNTER SYMPTOMS
HEADACHES: 0
WHEEZING: 0
EYE REDNESS: 0
FACIAL SWELLING: 0
DIARRHEA: 0
RHINORRHEA: 1
EYE DISCHARGE: 0
CHEST TIGHTNESS: 0
SORE THROAT: 0
SINUS PRESSURE: 0
DIZZINESS: 0
TROUBLE SWALLOWING: 0
NAUSEA: 0
CHILLS: 0
FEVER: 0
SHORTNESS OF BREATH: 0
VOMITING: 0
ABDOMINAL PAIN: 0
SINUS PAIN: 0

## 2019-09-17 ENCOUNTER — TELEPHONE (OUTPATIENT)
Dept: SCHEDULING | Age: 59
End: 2019-09-17

## 2019-10-16 ENCOUNTER — TELEPHONE (OUTPATIENT)
Dept: SCHEDULING | Age: 59
End: 2019-10-16

## 2019-10-18 ENCOUNTER — OFFICE VISIT (OUTPATIENT)
Dept: INTERNAL MEDICINE | Age: 59
End: 2019-10-18

## 2019-10-18 VITALS
BODY MASS INDEX: 26.22 KG/M2 | SYSTOLIC BLOOD PRESSURE: 146 MMHG | WEIGHT: 160 LBS | DIASTOLIC BLOOD PRESSURE: 94 MMHG | HEART RATE: 49 BPM

## 2019-10-18 DIAGNOSIS — Z23 NEED FOR IMMUNIZATION AGAINST INFLUENZA: ICD-10-CM

## 2019-10-18 DIAGNOSIS — H57.9 ITCHY EYES: Primary | ICD-10-CM

## 2019-10-18 PROCEDURE — 90686 IIV4 VACC NO PRSV 0.5 ML IM: CPT | Performed by: INTERNAL MEDICINE

## 2019-10-18 PROCEDURE — 90471 IMMUNIZATION ADMIN: CPT | Performed by: INTERNAL MEDICINE

## 2019-10-18 PROCEDURE — 99214 OFFICE O/P EST MOD 30 MIN: CPT | Performed by: INTERNAL MEDICINE

## 2019-10-18 RX ORDER — NEOMYCIN SULFATE, POLYMYXIN B SULFATE, AND DEXAMETHASONE 3.5; 10000; 1 MG/G; [USP'U]/G; MG/G
OINTMENT OPHTHALMIC
Refills: 1 | COMMUNITY
Start: 2019-10-14 | End: 2019-12-09 | Stop reason: ALTCHOICE

## 2019-10-18 RX ORDER — FLUTICASONE PROPIONATE 50 MCG
SPRAY, SUSPENSION (ML) NASAL
Refills: 11 | COMMUNITY
Start: 2019-09-23 | End: 2020-12-02 | Stop reason: SDUPTHER

## 2019-10-30 ENCOUNTER — APPOINTMENT (OUTPATIENT)
Dept: INTERNAL MEDICINE | Age: 59
End: 2019-10-30

## 2019-11-06 ENCOUNTER — APPOINTMENT (OUTPATIENT)
Dept: INTERNAL MEDICINE | Age: 59
End: 2019-11-06

## 2019-12-09 ENCOUNTER — OFFICE VISIT (OUTPATIENT)
Dept: INTERNAL MEDICINE | Age: 59
End: 2019-12-09

## 2019-12-09 VITALS
SYSTOLIC BLOOD PRESSURE: 161 MMHG | DIASTOLIC BLOOD PRESSURE: 97 MMHG | HEIGHT: 66 IN | WEIGHT: 149.6 LBS | BODY MASS INDEX: 24.04 KG/M2

## 2019-12-09 DIAGNOSIS — Z00.00 ANNUAL PHYSICAL EXAM: Primary | ICD-10-CM

## 2019-12-09 DIAGNOSIS — I10 ESSENTIAL HYPERTENSION: ICD-10-CM

## 2019-12-09 PROCEDURE — 99396 PREV VISIT EST AGE 40-64: CPT | Performed by: INTERNAL MEDICINE

## 2019-12-09 PROCEDURE — 3080F DIAST BP >= 90 MM HG: CPT | Performed by: INTERNAL MEDICINE

## 2019-12-09 PROCEDURE — 3077F SYST BP >= 140 MM HG: CPT | Performed by: INTERNAL MEDICINE

## 2019-12-09 RX ORDER — LISINOPRIL 20 MG/1
20 TABLET ORAL DAILY
Qty: 90 TABLET | Refills: 3 | Status: SHIPPED | OUTPATIENT
Start: 2019-12-09 | End: 2020-12-21 | Stop reason: SDUPTHER

## 2019-12-09 ASSESSMENT — ENCOUNTER SYMPTOMS
SORE THROAT: 0
ABDOMINAL PAIN: 0
FEVER: 0
BACK PAIN: 0
EYE DISCHARGE: 0
EYE ITCHING: 0
DIARRHEA: 0
DIZZINESS: 0
VOMITING: 0
NAUSEA: 0
PHOTOPHOBIA: 0
HEADACHES: 0
SHORTNESS OF BREATH: 0
COUGH: 0
EYE REDNESS: 0
CHILLS: 0
CONSTIPATION: 0
EYE PAIN: 0

## 2019-12-09 ASSESSMENT — PATIENT HEALTH QUESTIONNAIRE - PHQ9
1. LITTLE INTEREST OR PLEASURE IN DOING THINGS: NOT AT ALL
2. FEELING DOWN, DEPRESSED OR HOPELESS: NOT AT ALL
SUM OF ALL RESPONSES TO PHQ9 QUESTIONS 1 AND 2: 0
SUM OF ALL RESPONSES TO PHQ9 QUESTIONS 1 AND 2: 0

## 2019-12-16 ENCOUNTER — LAB SERVICES (OUTPATIENT)
Dept: LAB | Age: 59
End: 2019-12-16

## 2019-12-16 DIAGNOSIS — Z00.00 ANNUAL PHYSICAL EXAM: ICD-10-CM

## 2019-12-16 LAB
ALBUMIN SERPL-MCNC: 4.1 G/DL (ref 3.6–5.1)
ALBUMIN/GLOB SERPL: 1.6 {RATIO} (ref 1–2.4)
ALP SERPL-CCNC: 75 UNITS/L (ref 45–117)
ALT SERPL-CCNC: 24 UNITS/L
ANION GAP SERPL CALC-SCNC: 8 MMOL/L (ref 10–20)
AST SERPL-CCNC: 20 UNITS/L
BASOPHILS # BLD AUTO: 0 K/MCL (ref 0–0.3)
BASOPHILS NFR BLD AUTO: 1 %
BILIRUB SERPL-MCNC: 0.6 MG/DL (ref 0.2–1)
BUN SERPL-MCNC: 20 MG/DL (ref 6–20)
BUN/CREAT SERPL: 27 (ref 7–25)
CALCIUM SERPL-MCNC: 10 MG/DL (ref 8.4–10.2)
CHLORIDE SERPL-SCNC: 109 MMOL/L (ref 98–107)
CHOLEST SERPL-MCNC: 198 MG/DL
CHOLEST/HDLC SERPL: 3.2 {RATIO}
CO2 SERPL-SCNC: 30 MMOL/L (ref 21–32)
CREAT SERPL-MCNC: 0.75 MG/DL (ref 0.51–0.95)
DIFFERENTIAL METHOD BLD: NORMAL
EOSINOPHIL # BLD AUTO: 0.2 K/MCL (ref 0.1–0.5)
EOSINOPHIL NFR SPEC: 4 %
ERYTHROCYTE [DISTWIDTH] IN BLOOD: 12.6 % (ref 11–15)
FASTING STATUS PATIENT QL REPORTED: 12 HRS
GLOBULIN SER-MCNC: 2.6 G/DL (ref 2–4)
GLUCOSE SERPL-MCNC: 80 MG/DL (ref 65–99)
HCT VFR BLD CALC: 40.5 % (ref 36–46.5)
HDLC SERPL-MCNC: 61 MG/DL
HGB BLD-MCNC: 13.2 G/DL (ref 12–15.5)
IMM GRANULOCYTES # BLD AUTO: 0 K/MCL (ref 0–0.2)
IMM GRANULOCYTES NFR BLD: 0 %
LDLC SERPL-MCNC: 119 MG/DL
LENGTH OF FAST TIME PATIENT: 12 HRS
LYMPHOCYTES # BLD MANUAL: 1.8 K/MCL (ref 1–4)
LYMPHOCYTES NFR BLD MANUAL: 38 %
MCH RBC QN AUTO: 30.5 PG (ref 26–34)
MCHC RBC AUTO-ENTMCNC: 32.6 G/DL (ref 32–36.5)
MCV RBC AUTO: 93.5 FL (ref 78–100)
MONOCYTES # BLD MANUAL: 0.4 K/MCL (ref 0.3–0.9)
MONOCYTES NFR BLD MANUAL: 9 %
NEUTROPHILS # BLD: 2.3 K/MCL (ref 1.8–7.7)
NEUTROPHILS NFR BLD AUTO: 48 %
NONHDLC SERPL-MCNC: 137 MG/DL
NRBC BLD MANUAL-RTO: 0 /100 WBC
PLATELET # BLD: 175 K/MCL (ref 140–450)
POTASSIUM SERPL-SCNC: 4.9 MMOL/L (ref 3.4–5.1)
PROT SERPL-MCNC: 6.7 G/DL (ref 6.4–8.2)
RBC # BLD: 4.33 MIL/MCL (ref 4–5.2)
SODIUM SERPL-SCNC: 142 MMOL/L (ref 135–145)
TRIGL SERPL-MCNC: 92 MG/DL
WBC # BLD: 4.7 K/MCL (ref 4.2–11)

## 2019-12-16 PROCEDURE — 36415 COLL VENOUS BLD VENIPUNCTURE: CPT | Performed by: INTERNAL MEDICINE

## 2019-12-16 PROCEDURE — 85025 COMPLETE CBC W/AUTO DIFF WBC: CPT | Performed by: INTERNAL MEDICINE

## 2019-12-16 PROCEDURE — 80053 COMPREHEN METABOLIC PANEL: CPT | Performed by: INTERNAL MEDICINE

## 2019-12-16 PROCEDURE — 80061 LIPID PANEL: CPT | Performed by: INTERNAL MEDICINE

## 2019-12-20 ENCOUNTER — TELEPHONE (OUTPATIENT)
Dept: INTERNAL MEDICINE | Age: 59
End: 2019-12-20

## 2020-02-25 DIAGNOSIS — Z12.31 ENCOUNTER FOR SCREENING MAMMOGRAM FOR MALIGNANT NEOPLASM OF BREAST: Primary | ICD-10-CM

## 2020-03-03 ENCOUNTER — HOSPITAL ENCOUNTER (OUTPATIENT)
Dept: MAMMOGRAPHY | Age: 60
Discharge: HOME | End: 2020-03-03
Attending: OBSTETRICS & GYNECOLOGY

## 2020-03-03 DIAGNOSIS — Z12.31 ENCOUNTER FOR SCREENING MAMMOGRAM FOR MALIGNANT NEOPLASM OF BREAST: ICD-10-CM

## 2020-03-03 PROCEDURE — 77063 BREAST TOMOSYNTHESIS BI: CPT

## 2020-07-16 ENCOUNTER — PATIENT OUTREACH (OUTPATIENT)
Dept: FAMILY MEDICINE CLINIC | Facility: CLINIC | Age: 60
End: 2020-07-16

## 2020-07-16 DIAGNOSIS — Z12.31 ENCOUNTER FOR SCREENING MAMMOGRAM FOR MALIGNANT NEOPLASM OF BREAST: Primary | ICD-10-CM

## 2020-09-15 ENCOUNTER — TELEPHONE (OUTPATIENT)
Dept: SCHEDULING | Age: 60
End: 2020-09-15

## 2020-10-01 ENCOUNTER — TELEPHONE (OUTPATIENT)
Dept: SCHEDULING | Age: 60
End: 2020-10-01

## 2020-10-02 ENCOUNTER — IMMUNIZATION (OUTPATIENT)
Dept: URGENT CARE | Age: 60
End: 2020-10-02

## 2020-10-02 VITALS — TEMPERATURE: 97.8 F

## 2020-10-02 DIAGNOSIS — Z23 NEED FOR VACCINATION: Primary | ICD-10-CM

## 2020-10-02 PROCEDURE — 90686 IIV4 VACC NO PRSV 0.5 ML IM: CPT | Performed by: NURSE PRACTITIONER

## 2020-10-02 PROCEDURE — G0008 ADMIN INFLUENZA VIRUS VAC: HCPCS | Performed by: NURSE PRACTITIONER

## 2020-11-30 ENCOUNTER — TELEPHONE (OUTPATIENT)
Dept: SCHEDULING | Age: 60
End: 2020-11-30

## 2020-12-02 ENCOUNTER — TELEPHONE (OUTPATIENT)
Dept: INTERNAL MEDICINE | Age: 60
End: 2020-12-02

## 2020-12-02 RX ORDER — FLUTICASONE PROPIONATE 50 MCG
2 SPRAY, SUSPENSION (ML) NASAL DAILY
Qty: 16 G | Refills: 11 | Status: SHIPPED | OUTPATIENT
Start: 2020-12-02 | End: 2022-04-07

## 2021-01-25 ENCOUNTER — TELEPHONE (OUTPATIENT)
Dept: SCHEDULING | Age: 61
End: 2021-01-25

## 2021-01-26 ENCOUNTER — APPOINTMENT (OUTPATIENT)
Dept: INTERNAL MEDICINE | Age: 61
End: 2021-01-26

## 2021-02-02 ENCOUNTER — OFFICE VISIT (OUTPATIENT)
Dept: INTERNAL MEDICINE | Age: 61
End: 2021-02-02

## 2021-02-02 VITALS
SYSTOLIC BLOOD PRESSURE: 151 MMHG | DIASTOLIC BLOOD PRESSURE: 85 MMHG | WEIGHT: 154.2 LBS | BODY MASS INDEX: 25.27 KG/M2 | TEMPERATURE: 96.2 F | HEART RATE: 48 BPM

## 2021-02-02 DIAGNOSIS — Z00.00 ANNUAL PHYSICAL EXAM: Primary | ICD-10-CM

## 2021-02-02 DIAGNOSIS — Z13.6 SCREENING FOR HEART DISEASE: ICD-10-CM

## 2021-02-02 DIAGNOSIS — Z76.89 ENCOUNTER FOR SKIN CARE: ICD-10-CM

## 2021-02-02 DIAGNOSIS — I10 ESSENTIAL HYPERTENSION: ICD-10-CM

## 2021-02-02 DIAGNOSIS — J30.2 SEASONAL ALLERGIES: ICD-10-CM

## 2021-02-02 PROCEDURE — 99396 PREV VISIT EST AGE 40-64: CPT | Performed by: INTERNAL MEDICINE

## 2021-02-02 PROCEDURE — 93000 ELECTROCARDIOGRAM COMPLETE: CPT | Performed by: INTERNAL MEDICINE

## 2021-02-02 PROCEDURE — 3079F DIAST BP 80-89 MM HG: CPT | Performed by: INTERNAL MEDICINE

## 2021-02-02 PROCEDURE — 3077F SYST BP >= 140 MM HG: CPT | Performed by: INTERNAL MEDICINE

## 2021-02-02 RX ORDER — LISINOPRIL 40 MG/1
40 TABLET ORAL DAILY
Qty: 90 TABLET | Refills: 3 | Status: SHIPPED | OUTPATIENT
Start: 2021-02-02 | End: 2021-02-09 | Stop reason: DRUGHIGH

## 2021-02-02 ASSESSMENT — ENCOUNTER SYMPTOMS
CONSTIPATION: 0
SORE THROAT: 0
COUGH: 0
HEADACHES: 0
DIZZINESS: 0
ABDOMINAL PAIN: 0
DIARRHEA: 0
NAUSEA: 0
VOMITING: 0
SHORTNESS OF BREATH: 0
CHILLS: 0
WHEEZING: 0
RHINORRHEA: 1
FEVER: 0

## 2021-02-02 ASSESSMENT — PATIENT HEALTH QUESTIONNAIRE - PHQ9
CLINICAL INTERPRETATION OF PHQ2 SCORE: NO FURTHER SCREENING NEEDED
CLINICAL INTERPRETATION OF PHQ9 SCORE: NO FURTHER SCREENING NEEDED
SUM OF ALL RESPONSES TO PHQ9 QUESTIONS 1 AND 2: 0
1. LITTLE INTEREST OR PLEASURE IN DOING THINGS: NOT AT ALL
2. FEELING DOWN, DEPRESSED OR HOPELESS: NOT AT ALL
SUM OF ALL RESPONSES TO PHQ9 QUESTIONS 1 AND 2: 0

## 2021-02-09 RX ORDER — LISINOPRIL 40 MG/1
20 TABLET ORAL DAILY
Qty: 90 TABLET | Refills: 3 | Status: SHIPPED | OUTPATIENT
Start: 2021-02-09 | End: 2021-03-24 | Stop reason: DRUGHIGH

## 2021-02-10 ENCOUNTER — TELEPHONE (OUTPATIENT)
Dept: SCHEDULING | Age: 61
End: 2021-02-10

## 2021-02-10 ENCOUNTER — LAB SERVICES (OUTPATIENT)
Dept: LAB | Age: 61
End: 2021-02-10

## 2021-02-10 DIAGNOSIS — Z00.00 ANNUAL PHYSICAL EXAM: ICD-10-CM

## 2021-02-10 LAB
25(OH)D3+25(OH)D2 SERPL-MCNC: 22.4 NG/ML (ref 30–100)
ALBUMIN SERPL-MCNC: 4 G/DL (ref 3.6–5.1)
ALBUMIN/GLOB SERPL: 1.4 {RATIO} (ref 1–2.4)
ALP SERPL-CCNC: 76 UNITS/L (ref 45–117)
ALT SERPL-CCNC: 26 UNITS/L
ANION GAP SERPL CALC-SCNC: 9 MMOL/L (ref 10–20)
AST SERPL-CCNC: 16 UNITS/L
BASOPHILS # BLD: 0.1 K/MCL (ref 0–0.3)
BASOPHILS NFR BLD: 1 %
BILIRUB SERPL-MCNC: 0.4 MG/DL (ref 0.2–1)
BUN SERPL-MCNC: 23 MG/DL (ref 6–20)
BUN/CREAT SERPL: 31 (ref 7–25)
CALCIUM SERPL-MCNC: 10.2 MG/DL (ref 8.4–10.2)
CHLORIDE SERPL-SCNC: 109 MMOL/L (ref 98–107)
CHOLEST SERPL-MCNC: 195 MG/DL
CHOLEST/HDLC SERPL: 2.9 {RATIO}
CO2 SERPL-SCNC: 28 MMOL/L (ref 21–32)
CREAT SERPL-MCNC: 0.75 MG/DL (ref 0.51–0.95)
DEPRECATED RDW RBC: 44.5 FL (ref 39–50)
EOSINOPHIL # BLD: 0.3 K/MCL (ref 0–0.5)
EOSINOPHIL NFR BLD: 6 %
ERYTHROCYTE [DISTWIDTH] IN BLOOD: 13 % (ref 11–15)
FASTING DURATION TIME PATIENT: 0 HOURS
FASTING DURATION TIME PATIENT: 12 HOURS
GFR SERPLBLD BASED ON 1.73 SQ M-ARVRAT: 88 ML/MIN/1.73M2
GLOBULIN SER-MCNC: 2.8 G/DL (ref 2–4)
GLUCOSE SERPL-MCNC: 84 MG/DL (ref 65–99)
HCT VFR BLD CALC: 40 % (ref 36–46.5)
HDLC SERPL-MCNC: 67 MG/DL
HGB BLD-MCNC: 12.8 G/DL (ref 12–15.5)
IMM GRANULOCYTES # BLD AUTO: 0 K/MCL (ref 0–0.2)
IMM GRANULOCYTES # BLD: 0 %
LDLC SERPL CALC-MCNC: 111 MG/DL
LYMPHOCYTES # BLD: 1.4 K/MCL (ref 1–4)
LYMPHOCYTES NFR BLD: 34 %
MCH RBC QN AUTO: 30.2 PG (ref 26–34)
MCHC RBC AUTO-ENTMCNC: 32 G/DL (ref 32–36.5)
MCV RBC AUTO: 94.3 FL (ref 78–100)
MONOCYTES # BLD: 0.4 K/MCL (ref 0.3–0.9)
MONOCYTES NFR BLD: 11 %
NEUTROPHILS # BLD: 2 K/MCL (ref 1.8–7.7)
NEUTROPHILS NFR BLD: 48 %
NONHDLC SERPL-MCNC: 128 MG/DL
NRBC BLD MANUAL-RTO: 0 /100 WBC
PLATELET # BLD AUTO: 177 K/MCL (ref 140–450)
POTASSIUM SERPL-SCNC: 4.3 MMOL/L (ref 3.4–5.1)
PROT SERPL-MCNC: 6.8 G/DL (ref 6.4–8.2)
RBC # BLD: 4.24 MIL/MCL (ref 4–5.2)
SODIUM SERPL-SCNC: 142 MMOL/L (ref 135–145)
TRIGL SERPL-MCNC: 83 MG/DL
WBC # BLD: 4.2 K/MCL (ref 4.2–11)

## 2021-02-10 PROCEDURE — 82306 VITAMIN D 25 HYDROXY: CPT | Performed by: INTERNAL MEDICINE

## 2021-02-10 PROCEDURE — 36415 COLL VENOUS BLD VENIPUNCTURE: CPT | Performed by: INTERNAL MEDICINE

## 2021-02-10 PROCEDURE — 80061 LIPID PANEL: CPT | Performed by: INTERNAL MEDICINE

## 2021-02-10 PROCEDURE — 85025 COMPLETE CBC W/AUTO DIFF WBC: CPT | Performed by: INTERNAL MEDICINE

## 2021-02-10 PROCEDURE — 80053 COMPREHEN METABOLIC PANEL: CPT | Performed by: INTERNAL MEDICINE

## 2021-03-05 ENCOUNTER — HOSPITAL ENCOUNTER (OUTPATIENT)
Dept: MAMMOGRAPHY | Age: 61
Discharge: HOME | End: 2021-03-05
Attending: OBSTETRICS & GYNECOLOGY

## 2021-03-05 DIAGNOSIS — Z12.39 BREAST CANCER SCREENING: ICD-10-CM

## 2021-03-05 PROCEDURE — 77067 SCR MAMMO BI INCL CAD: CPT

## 2021-03-24 ENCOUNTER — TELEPHONE (OUTPATIENT)
Dept: SCHEDULING | Age: 61
End: 2021-03-24

## 2021-03-24 DIAGNOSIS — I10 ESSENTIAL HYPERTENSION: ICD-10-CM

## 2021-03-24 RX ORDER — LISINOPRIL 40 MG/1
40 TABLET ORAL DAILY
Qty: 90 TABLET | Refills: 3 | Status: SHIPPED | OUTPATIENT
Start: 2021-03-24 | End: 2021-03-29 | Stop reason: DRUGHIGH

## 2021-03-29 RX ORDER — LISINOPRIL 20 MG/1
20 TABLET ORAL DAILY
Qty: 90 TABLET | Refills: 3 | Status: SHIPPED | OUTPATIENT
Start: 2021-03-29 | End: 2022-09-21 | Stop reason: SDUPTHER

## 2021-04-16 ENCOUNTER — HOSPITAL ENCOUNTER (OUTPATIENT)
Dept: CT IMAGING | Age: 61
Discharge: HOME | End: 2021-04-16
Attending: INTERNAL MEDICINE

## 2021-04-16 DIAGNOSIS — Z13.6 SCREENING FOR HEART DISEASE: ICD-10-CM

## 2021-04-16 PROCEDURE — 75571 CT HRT W/O DYE W/CA TEST: CPT

## 2021-04-16 PROCEDURE — 75571 CT HRT W/O DYE W/CA TEST: CPT | Performed by: INTERNAL MEDICINE

## 2021-06-03 ENCOUNTER — LAB REQUISITION (OUTPATIENT)
Dept: LAB | Age: 61
End: 2021-06-03

## 2021-06-03 DIAGNOSIS — Z01.419 ENCOUNTER FOR GYNECOLOGICAL EXAMINATION (GENERAL) (ROUTINE) WITHOUT ABNORMAL FINDINGS: ICD-10-CM

## 2021-06-03 PROCEDURE — 87624 HPV HI-RISK TYP POOLED RSLT: CPT | Performed by: CLINICAL MEDICAL LABORATORY

## 2021-06-03 PROCEDURE — 88175 CYTOPATH C/V AUTO FLUID REDO: CPT | Performed by: CLINICAL MEDICAL LABORATORY

## 2021-06-09 LAB
CASE RPRT: NORMAL
CYTOLOGY CVX/VAG STUDY: NORMAL
HPV16+18+45 E6+E7MRNA CVX NAA+PROBE: NEGATIVE
Lab: NORMAL
PAP EDUCATIONAL NOTE: NORMAL
SPECIMEN ADEQUACY: NORMAL

## 2021-10-28 ENCOUNTER — TELEPHONE (OUTPATIENT)
Dept: SCHEDULING | Age: 61
End: 2021-10-28

## 2021-11-06 ENCOUNTER — IMMUNIZATION (OUTPATIENT)
Dept: FAMILY MEDICINE | Age: 61
End: 2021-11-06

## 2021-11-06 DIAGNOSIS — Z23 NEED FOR VACCINATION: Primary | ICD-10-CM

## 2021-11-06 PROCEDURE — 90471 IMMUNIZATION ADMIN: CPT

## 2021-11-06 PROCEDURE — 90686 IIV4 VACC NO PRSV 0.5 ML IM: CPT

## 2022-01-05 ENCOUNTER — TELEPHONE (OUTPATIENT)
Dept: SCHEDULING | Age: 62
End: 2022-01-05

## 2022-01-06 ENCOUNTER — APPOINTMENT (OUTPATIENT)
Dept: INTERNAL MEDICINE | Age: 62
End: 2022-01-06

## 2022-01-10 ENCOUNTER — WALK IN (OUTPATIENT)
Dept: URGENT CARE | Age: 62
End: 2022-01-10

## 2022-01-10 VITALS
RESPIRATION RATE: 18 BRPM | TEMPERATURE: 98.3 F | DIASTOLIC BLOOD PRESSURE: 97 MMHG | HEART RATE: 79 BPM | SYSTOLIC BLOOD PRESSURE: 149 MMHG | OXYGEN SATURATION: 100 %

## 2022-01-10 DIAGNOSIS — N39.0 URINARY TRACT INFECTION WITH HEMATURIA, SITE UNSPECIFIED: ICD-10-CM

## 2022-01-10 DIAGNOSIS — R35.0 FREQUENCY OF URINATION: Primary | ICD-10-CM

## 2022-01-10 DIAGNOSIS — R31.9 URINARY TRACT INFECTION WITH HEMATURIA, SITE UNSPECIFIED: ICD-10-CM

## 2022-01-10 DIAGNOSIS — R30.0 BURNING WITH URINATION: ICD-10-CM

## 2022-01-10 LAB
APPEARANCE, POC: ABNORMAL
BILIRUBIN, POC: NEGATIVE
COLOR, POC: YELLOW
GLUCOSE UR-MCNC: NEGATIVE MG/DL
KETONES, POC: NEGATIVE MG/DL
NITRITE, POC: NEGATIVE
OCCULT BLOOD, POC: ABNORMAL
PH UR: 6.5 [PH] (ref 5–7)
PROT UR-MCNC: ABNORMAL MG/DL
SP GR UR: 1.03 (ref 1–1.03)
UROBILINOGEN UR-MCNC: 0.2 MG/DL (ref 0–1)
WBC (LEUKOCYTE) ESTERASE, POC: ABNORMAL

## 2022-01-10 PROCEDURE — 87186 SC STD MICRODIL/AGAR DIL: CPT | Performed by: PSYCHIATRY & NEUROLOGY

## 2022-01-10 PROCEDURE — 87077 CULTURE AEROBIC IDENTIFY: CPT | Performed by: PSYCHIATRY & NEUROLOGY

## 2022-01-10 PROCEDURE — 3077F SYST BP >= 140 MM HG: CPT | Performed by: EMERGENCY MEDICINE

## 2022-01-10 PROCEDURE — 99213 OFFICE O/P EST LOW 20 MIN: CPT | Performed by: EMERGENCY MEDICINE

## 2022-01-10 PROCEDURE — 87086 URINE CULTURE/COLONY COUNT: CPT | Performed by: PSYCHIATRY & NEUROLOGY

## 2022-01-10 PROCEDURE — 81002 URINALYSIS NONAUTO W/O SCOPE: CPT | Performed by: EMERGENCY MEDICINE

## 2022-01-10 PROCEDURE — 3080F DIAST BP >= 90 MM HG: CPT | Performed by: EMERGENCY MEDICINE

## 2022-01-10 RX ORDER — NITROFURANTOIN 25; 75 MG/1; MG/1
100 CAPSULE ORAL 2 TIMES DAILY
Qty: 10 CAPSULE | Refills: 0 | Status: SHIPPED | OUTPATIENT
Start: 2022-01-10 | End: 2022-01-15

## 2022-01-14 LAB
BACTERIA UR CULT: ABNORMAL
BACTERIA UR CULT: ABNORMAL

## 2022-02-09 ENCOUNTER — HOSPITAL ENCOUNTER (EMERGENCY)
Facility: HOSPITAL | Age: 62
Discharge: HOME | End: 2022-02-10
Attending: STUDENT IN AN ORGANIZED HEALTH CARE EDUCATION/TRAINING PROGRAM
Payer: COMMERCIAL

## 2022-02-09 ENCOUNTER — HOSPITAL ENCOUNTER (EMERGENCY)
Facility: HOSPITAL | Age: 62
Discharge: HOME | End: 2022-02-09
Attending: EMERGENCY MEDICINE
Payer: COMMERCIAL

## 2022-02-09 ENCOUNTER — APPOINTMENT (OUTPATIENT)
Dept: CT IMAGING | Facility: HOSPITAL | Age: 62
End: 2022-02-09
Attending: STUDENT IN AN ORGANIZED HEALTH CARE EDUCATION/TRAINING PROGRAM
Payer: COMMERCIAL

## 2022-02-09 VITALS
HEIGHT: 66 IN | OXYGEN SATURATION: 98 % | RESPIRATION RATE: 18 BRPM | DIASTOLIC BLOOD PRESSURE: 84 MMHG | SYSTOLIC BLOOD PRESSURE: 127 MMHG | TEMPERATURE: 98 F | BODY MASS INDEX: 23.78 KG/M2 | WEIGHT: 148 LBS | HEART RATE: 60 BPM

## 2022-02-09 VITALS
OXYGEN SATURATION: 100 % | RESPIRATION RATE: 16 BRPM | HEART RATE: 58 BPM | SYSTOLIC BLOOD PRESSURE: 140 MMHG | DIASTOLIC BLOOD PRESSURE: 98 MMHG

## 2022-02-09 DIAGNOSIS — S09.8XXA BLUNT HEAD TRAUMA, INITIAL ENCOUNTER: ICD-10-CM

## 2022-02-09 DIAGNOSIS — S00.03XA HEMATOMA OF SCALP, INITIAL ENCOUNTER: Primary | ICD-10-CM

## 2022-02-09 DIAGNOSIS — S09.90XD INJURY OF HEAD, SUBSEQUENT ENCOUNTER: Primary | ICD-10-CM

## 2022-02-09 PROCEDURE — 99283 EMERGENCY DEPT VISIT LOW MDM: CPT

## 2022-02-09 PROCEDURE — 70450 CT HEAD/BRAIN W/O DYE: CPT | Performed by: STUDENT IN AN ORGANIZED HEALTH CARE EDUCATION/TRAINING PROGRAM

## 2022-02-09 PROCEDURE — 99284 EMERGENCY DEPT VISIT MOD MDM: CPT

## 2022-02-09 RX ORDER — LISINOPRIL 20 MG/1
20 TABLET ORAL DAILY
COMMUNITY

## 2022-02-09 NOTE — ED INITIAL ASSESSMENT (HPI)
Pt fell on ice walking dog, fell backwards on head, bleeding controlled upon EMT assessment. Pt states no LOC.

## 2022-02-10 NOTE — ED INITIAL ASSESSMENT (HPI)
Pt to ED for stating that she's not comfortable not getting a CT Head when she was seen in the ED by Dr. Xu Odonnell earlier today, especially after reading her discharge papers. Pt states she had the \"following symptoms and CT Scan wasn't done: lightheadedness and dizziness especially when going up & down the stairs, fatigue, sleepy but inability to fall asleep. \" Pt came for closed head injury, no LOC earlier today.

## 2022-02-14 ENCOUNTER — TELEPHONE (OUTPATIENT)
Dept: SCHEDULING | Age: 62
End: 2022-02-14

## 2022-03-14 ENCOUNTER — OFFICE VISIT (OUTPATIENT)
Dept: INTERNAL MEDICINE | Age: 62
End: 2022-03-14

## 2022-03-14 VITALS
SYSTOLIC BLOOD PRESSURE: 115 MMHG | TEMPERATURE: 97 F | DIASTOLIC BLOOD PRESSURE: 81 MMHG | HEART RATE: 59 BPM | BODY MASS INDEX: 24.91 KG/M2 | WEIGHT: 152 LBS

## 2022-03-14 DIAGNOSIS — E55.9 VITAMIN D DEFICIENCY: ICD-10-CM

## 2022-03-14 DIAGNOSIS — I10 ESSENTIAL HYPERTENSION: Primary | ICD-10-CM

## 2022-03-14 DIAGNOSIS — Z80.8 FAMILY HISTORY OF NONMELANOMA SKIN CANCER: ICD-10-CM

## 2022-03-14 DIAGNOSIS — Z12.31 BREAST CANCER SCREENING BY MAMMOGRAM: ICD-10-CM

## 2022-03-14 DIAGNOSIS — Z11.59 ENCOUNTER FOR HEPATITIS C SCREENING TEST FOR LOW RISK PATIENT: ICD-10-CM

## 2022-03-14 PROCEDURE — 3079F DIAST BP 80-89 MM HG: CPT | Performed by: INTERNAL MEDICINE

## 2022-03-14 PROCEDURE — 99213 OFFICE O/P EST LOW 20 MIN: CPT | Performed by: INTERNAL MEDICINE

## 2022-03-14 PROCEDURE — 3074F SYST BP LT 130 MM HG: CPT | Performed by: INTERNAL MEDICINE

## 2022-03-14 ASSESSMENT — PAIN SCALES - GENERAL: PAINLEVEL: 0

## 2022-03-14 ASSESSMENT — PATIENT HEALTH QUESTIONNAIRE - PHQ9
SUM OF ALL RESPONSES TO PHQ9 QUESTIONS 1 AND 2: 0
SUM OF ALL RESPONSES TO PHQ9 QUESTIONS 1 AND 2: 0
1. LITTLE INTEREST OR PLEASURE IN DOING THINGS: NOT AT ALL
2. FEELING DOWN, DEPRESSED OR HOPELESS: NOT AT ALL
CLINICAL INTERPRETATION OF PHQ2 SCORE: NO FURTHER SCREENING NEEDED

## 2022-03-26 LAB
25(OH)D3 SERPL-MCNC: 34 NG/ML (ref 30–100)
ALBUMIN SERPL-MCNC: 4.4 G/DL (ref 3.6–5.1)
ALBUMIN/GLOB SERPL: 1.8 (CALC) (ref 1–2.5)
ALP SERPL-CCNC: 69 U/L (ref 37–153)
ALT SERPL-CCNC: 15 U/L (ref 6–29)
AST SERPL-CCNC: 18 U/L (ref 10–35)
BASOPHILS # BLD AUTO: 52 CELLS/UL (ref 0–200)
BASOPHILS NFR BLD AUTO: 1.2 %
BILIRUB SERPL-MCNC: 0.6 MG/DL (ref 0.2–1.2)
BUN SERPL-MCNC: 25 MG/DL (ref 7–25)
BUN/CREAT SERPL: ABNORMAL (CALC) (ref 6–22)
CALCIUM SERPL-MCNC: 10.7 MG/DL (ref 8.6–10.4)
CHLORIDE SERPL-SCNC: 107 MMOL/L (ref 98–110)
CHOLEST SERPL-MCNC: 195 MG/DL
CHOLEST/HDLC SERPL: 2.8 (CALC)
CO2 SERPL-SCNC: 29 MMOL/L (ref 20–32)
CREAT SERPL-MCNC: 0.83 MG/DL (ref 0.5–1.05)
EOSINOPHIL # BLD AUTO: 120 CELLS/UL (ref 15–500)
EOSINOPHIL NFR BLD AUTO: 2.8 %
ERYTHROCYTE [DISTWIDTH] IN BLOOD BY AUTOMATED COUNT: 12.8 % (ref 11–15)
GLOBULIN SER CALC-MCNC: 2.4 G/DL (CALC) (ref 1.9–3.7)
GLUCOSE SERPL-MCNC: 76 MG/DL (ref 65–99)
HCT VFR BLD AUTO: 40.3 % (ref 35–45)
HCV AB S/CO SERPL IA: 0.01
HCV AB SERPL QL IA: NORMAL
HDLC SERPL-MCNC: 69 MG/DL
HGB BLD-MCNC: 13.1 G/DL (ref 11.7–15.5)
LDLC SERPL CALC-MCNC: 108 MG/DL (CALC)
LYMPHOCYTES # BLD AUTO: 1372 CELLS/UL (ref 850–3900)
LYMPHOCYTES NFR BLD AUTO: 31.9 %
MCH RBC QN AUTO: 29.8 PG (ref 27–33)
MCHC RBC AUTO-ENTMCNC: 32.5 G/DL (ref 32–36)
MCV RBC AUTO: 91.8 FL (ref 80–100)
MONOCYTES # BLD AUTO: 374 CELLS/UL (ref 200–950)
MONOCYTES NFR BLD AUTO: 8.7 %
NEUTROPHILS # BLD AUTO: 2382 CELLS/UL (ref 1500–7800)
NEUTROPHILS NFR BLD AUTO: 55.4 %
NONHDLC SERPL-MCNC: 126 MG/DL (CALC)
PLATELET # BLD AUTO: 201 THOUSAND/UL (ref 140–400)
PMV BLD REES-ECKER: 8.8 FL (ref 7.5–12.5)
POTASSIUM SERPL-SCNC: 4.4 MMOL/L (ref 3.5–5.3)
PROT SERPL-MCNC: 6.8 G/DL (ref 6.1–8.1)
RBC # BLD AUTO: 4.39 MILLION/UL (ref 3.8–5.1)
SODIUM SERPL-SCNC: 143 MMOL/L (ref 135–146)
TRIGL SERPL-MCNC: 86 MG/DL
WBC # BLD AUTO: 4.3 THOUSAND/UL (ref 3.8–10.8)

## 2022-03-30 ENCOUNTER — E-ADVICE (OUTPATIENT)
Dept: INTERNAL MEDICINE | Age: 62
End: 2022-03-30

## 2022-03-30 DIAGNOSIS — E83.52 HYPERCALCEMIA: Primary | ICD-10-CM

## 2022-04-07 RX ORDER — FLUTICASONE PROPIONATE 50 MCG
SPRAY, SUSPENSION (ML) NASAL
Qty: 16 G | Refills: 3 | Status: SHIPPED | OUTPATIENT
Start: 2022-04-07 | End: 2022-11-22

## 2022-04-11 ENCOUNTER — APPOINTMENT (OUTPATIENT)
Dept: MAMMOGRAPHY | Age: 62
End: 2022-04-11
Attending: INTERNAL MEDICINE

## 2022-04-13 ENCOUNTER — HOSPITAL ENCOUNTER (OUTPATIENT)
Dept: MAMMOGRAPHY | Age: 62
Discharge: HOME | End: 2022-04-13
Attending: INTERNAL MEDICINE

## 2022-04-13 DIAGNOSIS — Z12.31 BREAST CANCER SCREENING BY MAMMOGRAM: ICD-10-CM

## 2022-04-13 PROCEDURE — 77063 BREAST TOMOSYNTHESIS BI: CPT

## 2022-04-21 ENCOUNTER — LAB SERVICES (OUTPATIENT)
Dept: LAB | Age: 62
End: 2022-04-21

## 2022-04-21 DIAGNOSIS — E83.52 HYPERCALCEMIA: ICD-10-CM

## 2022-04-21 LAB
CA-I ADJ PH7.4 SERPL-SCNC: 1.4 MMOL/L (ref 1.15–1.29)
CA-I SERPL ISE-SCNC: 1.44 MMOL/L (ref 1.15–1.29)

## 2022-04-21 PROCEDURE — 36415 COLL VENOUS BLD VENIPUNCTURE: CPT | Performed by: INTERNAL MEDICINE

## 2022-04-21 PROCEDURE — 82330 ASSAY OF CALCIUM: CPT | Performed by: INTERNAL MEDICINE

## 2022-04-26 DIAGNOSIS — E83.52 HYPERCALCEMIA: Primary | ICD-10-CM

## 2022-06-16 ENCOUNTER — TELEPHONE (OUTPATIENT)
Dept: SCHEDULING | Age: 62
End: 2022-06-16

## 2022-06-17 ENCOUNTER — IMAGING SERVICES (OUTPATIENT)
Dept: GENERAL RADIOLOGY | Age: 62
End: 2022-06-17
Attending: INTERNAL MEDICINE

## 2022-06-17 ENCOUNTER — WALK IN (OUTPATIENT)
Dept: URGENT CARE | Age: 62
End: 2022-06-17

## 2022-06-17 VITALS
DIASTOLIC BLOOD PRESSURE: 80 MMHG | OXYGEN SATURATION: 100 % | TEMPERATURE: 95.9 F | HEART RATE: 41 BPM | RESPIRATION RATE: 19 BRPM | SYSTOLIC BLOOD PRESSURE: 120 MMHG

## 2022-06-17 DIAGNOSIS — S69.91XA INJURY OF RIGHT THUMB, INITIAL ENCOUNTER: Primary | ICD-10-CM
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
## 2022-06-17 DIAGNOSIS — S69.91XA INJURY OF RIGHT THUMB, INITIAL ENCOUNTER: ICD-10-CM

## 2022-06-17 DIAGNOSIS — S20.212A CHEST WALL CONTUSION, LEFT, INITIAL ENCOUNTER: ICD-10-CM

## 2022-06-17 PROCEDURE — L3908 WHO COCK-UP NONMOLDE PRE OTS: HCPCS

## 2022-06-17 PROCEDURE — 71101 X-RAY EXAM UNILAT RIBS/CHEST: CPT | Performed by: RADIOLOGY

## 2022-06-17 PROCEDURE — 73140 X-RAY EXAM OF FINGER(S): CPT | Performed by: RADIOLOGY

## 2022-06-17 PROCEDURE — 3074F SYST BP LT 130 MM HG: CPT | Performed by: INTERNAL MEDICINE

## 2022-06-17 PROCEDURE — 3079F DIAST BP 80-89 MM HG: CPT | Performed by: INTERNAL MEDICINE

## 2022-06-17 PROCEDURE — 99214 OFFICE O/P EST MOD 30 MIN: CPT | Performed by: INTERNAL MEDICINE

## 2022-08-11 ENCOUNTER — CLINICAL ABSTRACT (OUTPATIENT)
Dept: INTERNAL MEDICINE | Age: 62
End: 2022-08-11

## 2022-09-14 ENCOUNTER — LAB REQUISITION (OUTPATIENT)
Dept: LAB | Age: 62
End: 2022-09-14

## 2022-09-14 DIAGNOSIS — Z01.419 ENCOUNTER FOR GYNECOLOGICAL EXAMINATION (GENERAL) (ROUTINE) WITHOUT ABNORMAL FINDINGS: ICD-10-CM

## 2022-09-14 PROCEDURE — 87624 HPV HI-RISK TYP POOLED RSLT: CPT | Performed by: CLINICAL MEDICAL LABORATORY

## 2022-09-14 PROCEDURE — 88175 CYTOPATH C/V AUTO FLUID REDO: CPT | Performed by: CLINICAL MEDICAL LABORATORY

## 2022-09-14 PROCEDURE — 87625 HPV TYPES 16 & 18 ONLY: CPT | Performed by: CLINICAL MEDICAL LABORATORY

## 2022-09-15 LAB
HPV GENOTYPE 16: NEGATIVE
HPV GENOTYPE 18/45: NEGATIVE
Lab: NORMAL

## 2022-09-19 LAB
CASE RPRT: NORMAL
CLINICAL INFO: NORMAL
CYTOLOGY CVX/VAG STUDY: NORMAL
HPV16+18+45 E6+E7MRNA CVX NAA+PROBE: NEGATIVE
Lab: NORMAL
PAP EDUCATIONAL NOTE: NORMAL
SPECIMEN ADEQUACY: NORMAL

## 2022-09-21 DIAGNOSIS — I10 ESSENTIAL HYPERTENSION: ICD-10-CM

## 2022-09-21 RX ORDER — LISINOPRIL 20 MG/1
20 TABLET ORAL DAILY
Qty: 90 TABLET | Refills: 3 | Status: SHIPPED | OUTPATIENT
Start: 2022-09-21 | End: 2023-09-18 | Stop reason: SDUPTHER

## 2022-10-10 ENCOUNTER — TELEPHONE (OUTPATIENT)
Dept: SCHEDULING | Age: 62
End: 2022-10-10

## 2022-10-29 ENCOUNTER — IMMUNIZATION (OUTPATIENT)
Dept: INTERNAL MEDICINE | Age: 62
End: 2022-10-29

## 2022-10-29 DIAGNOSIS — Z23 NEED FOR VACCINATION: Primary | ICD-10-CM

## 2022-10-29 PROCEDURE — 90686 IIV4 VACC NO PRSV 0.5 ML IM: CPT | Performed by: INTERNAL MEDICINE

## 2022-10-29 PROCEDURE — 90471 IMMUNIZATION ADMIN: CPT | Performed by: INTERNAL MEDICINE

## 2022-11-22 ENCOUNTER — TELEPHONE (OUTPATIENT)
Dept: SCHEDULING | Age: 62
End: 2022-11-22

## 2022-11-22 RX ORDER — FLUTICASONE PROPIONATE 50 MCG
SPRAY, SUSPENSION (ML) NASAL
Qty: 16 G | Refills: 3 | Status: SHIPPED | OUTPATIENT
Start: 2022-11-22 | End: 2023-10-09 | Stop reason: ALTCHOICE

## 2022-12-21 ENCOUNTER — NURSE ONLY (OUTPATIENT)
Dept: INTERNAL MEDICINE | Age: 62
End: 2022-12-21

## 2022-12-21 DIAGNOSIS — Z23 NEED FOR SHINGLES VACCINE: Primary | ICD-10-CM

## 2022-12-21 PROCEDURE — 90471 IMMUNIZATION ADMIN: CPT | Performed by: INTERNAL MEDICINE

## 2022-12-21 PROCEDURE — X1094 NO CHARGE VISIT: HCPCS | Performed by: INTERNAL MEDICINE

## 2022-12-21 PROCEDURE — 90750 HZV VACC RECOMBINANT IM: CPT | Performed by: INTERNAL MEDICINE

## 2023-03-22 ENCOUNTER — APPOINTMENT (OUTPATIENT)
Dept: INTERNAL MEDICINE | Age: 63
End: 2023-03-22

## 2023-04-04 ENCOUNTER — OFFICE VISIT (OUTPATIENT)
Dept: INTERNAL MEDICINE | Age: 63
End: 2023-04-04

## 2023-04-04 VITALS
TEMPERATURE: 97.1 F | BODY MASS INDEX: 25.99 KG/M2 | HEART RATE: 53 BPM | WEIGHT: 156 LBS | DIASTOLIC BLOOD PRESSURE: 72 MMHG | HEIGHT: 65 IN | SYSTOLIC BLOOD PRESSURE: 112 MMHG

## 2023-04-04 DIAGNOSIS — I10 PRIMARY HYPERTENSION: Primary | ICD-10-CM

## 2023-04-04 DIAGNOSIS — Z00.00 HEALTH MAINTENANCE EXAMINATION: ICD-10-CM

## 2023-04-04 DIAGNOSIS — L98.9 SKIN LESION OF FACE: ICD-10-CM

## 2023-04-04 DIAGNOSIS — Z23 NEED FOR SHINGLES VACCINE: ICD-10-CM

## 2023-04-04 DIAGNOSIS — Z12.31 ENCOUNTER FOR SCREENING MAMMOGRAM FOR MALIGNANT NEOPLASM OF BREAST: ICD-10-CM

## 2023-04-04 PROCEDURE — 90471 IMMUNIZATION ADMIN: CPT | Performed by: INTERNAL MEDICINE

## 2023-04-04 PROCEDURE — 3078F DIAST BP <80 MM HG: CPT

## 2023-04-04 PROCEDURE — 99396 PREV VISIT EST AGE 40-64: CPT

## 2023-04-04 PROCEDURE — 90750 HZV VACC RECOMBINANT IM: CPT | Performed by: INTERNAL MEDICINE

## 2023-04-04 PROCEDURE — 3074F SYST BP LT 130 MM HG: CPT

## 2023-04-04 ASSESSMENT — PATIENT HEALTH QUESTIONNAIRE - PHQ9
SUM OF ALL RESPONSES TO PHQ9 QUESTIONS 1 AND 2: 0
1. LITTLE INTEREST OR PLEASURE IN DOING THINGS: NOT AT ALL
SUM OF ALL RESPONSES TO PHQ9 QUESTIONS 1 AND 2: 0
CLINICAL INTERPRETATION OF PHQ2 SCORE: NO FURTHER SCREENING NEEDED
2. FEELING DOWN, DEPRESSED OR HOPELESS: NOT AT ALL

## 2023-04-05 DIAGNOSIS — Z12.31 SCREENING MAMMOGRAM, ENCOUNTER FOR: Primary | ICD-10-CM

## 2023-04-17 ENCOUNTER — HOSPITAL ENCOUNTER (OUTPATIENT)
Dept: MAMMOGRAPHY | Age: 63
Discharge: HOME | End: 2023-04-17
Attending: INTERNAL MEDICINE

## 2023-04-17 DIAGNOSIS — Z12.31 SCREENING MAMMOGRAM, ENCOUNTER FOR: ICD-10-CM

## 2023-04-17 PROCEDURE — 77063 BREAST TOMOSYNTHESIS BI: CPT

## 2023-04-19 ENCOUNTER — E-ADVICE (OUTPATIENT)
Dept: INTERNAL MEDICINE | Age: 63
End: 2023-04-19

## 2023-04-20 LAB
ALBUMIN SERPL-MCNC: 4.2 G/DL (ref 3.6–5.1)
ALBUMIN/GLOB SERPL: 1.9 (CALC) (ref 1–2.5)
ALP SERPL-CCNC: 70 U/L (ref 37–153)
ALT SERPL-CCNC: 15 U/L (ref 6–29)
AST SERPL-CCNC: 17 U/L (ref 10–35)
BASOPHILS # BLD AUTO: 39 CELLS/UL (ref 0–200)
BASOPHILS NFR BLD AUTO: 1 %
BILIRUB SERPL-MCNC: 0.7 MG/DL (ref 0.2–1.2)
BUN SERPL-MCNC: 20 MG/DL (ref 7–25)
BUN/CREAT SERPL: NORMAL (CALC) (ref 6–22)
CALCIUM SERPL-MCNC: 10.4 MG/DL (ref 8.6–10.4)
CHLORIDE SERPL-SCNC: 106 MMOL/L (ref 98–110)
CHOLEST SERPL-MCNC: 183 MG/DL
CHOLEST/HDLC SERPL: 3.1 (CALC)
CO2 SERPL-SCNC: 29 MMOL/L (ref 20–32)
CREAT SERPL-MCNC: 0.77 MG/DL (ref 0.5–1.05)
EGFRCR SERPLBLD CKD-EPI 2021: 88 ML/MIN/1.73M2
EOSINOPHIL # BLD AUTO: 121 CELLS/UL (ref 15–500)
EOSINOPHIL NFR BLD AUTO: 3.1 %
ERYTHROCYTE [DISTWIDTH] IN BLOOD BY AUTOMATED COUNT: 12.5 % (ref 11–15)
GLOBULIN SER CALC-MCNC: 2.2 G/DL (CALC) (ref 1.9–3.7)
GLUCOSE SERPL-MCNC: 84 MG/DL (ref 65–99)
HBA1C MFR BLD: 5.1 % OF TOTAL HGB
HCT VFR BLD AUTO: 38.8 % (ref 35–45)
HDLC SERPL-MCNC: 60 MG/DL
HGB BLD-MCNC: 12.9 G/DL (ref 11.7–15.5)
LDLC SERPL CALC-MCNC: 104 MG/DL (CALC)
LYMPHOCYTES # BLD AUTO: 1346 CELLS/UL (ref 850–3900)
LYMPHOCYTES NFR BLD AUTO: 34.5 %
MCH RBC QN AUTO: 30.9 PG (ref 27–33)
MCHC RBC AUTO-ENTMCNC: 33.2 G/DL (ref 32–36)
MCV RBC AUTO: 92.8 FL (ref 80–100)
MONOCYTES # BLD AUTO: 335 CELLS/UL (ref 200–950)
MONOCYTES NFR BLD AUTO: 8.6 %
NEUTROPHILS # BLD AUTO: 2059 CELLS/UL (ref 1500–7800)
NEUTROPHILS NFR BLD AUTO: 52.8 %
NONHDLC SERPL-MCNC: 123 MG/DL (CALC)
PLATELET # BLD AUTO: 186 THOUSAND/UL (ref 140–400)
PMV BLD REES-ECKER: 8.7 FL (ref 7.5–12.5)
POTASSIUM SERPL-SCNC: 4.6 MMOL/L (ref 3.5–5.3)
PROT SERPL-MCNC: 6.4 G/DL (ref 6.1–8.1)
RBC # BLD AUTO: 4.18 MILLION/UL (ref 3.8–5.1)
SODIUM SERPL-SCNC: 140 MMOL/L (ref 135–146)
TRIGL SERPL-MCNC: 94 MG/DL
TSH SERPL-ACNC: 2.67 MIU/L (ref 0.4–4.5)
WBC # BLD AUTO: 3.9 THOUSAND/UL (ref 3.8–10.8)

## 2023-09-18 DIAGNOSIS — I10 ESSENTIAL HYPERTENSION: ICD-10-CM

## 2023-09-18 RX ORDER — LISINOPRIL 20 MG/1
20 TABLET ORAL DAILY
Qty: 90 TABLET | Refills: 0 | Status: SHIPPED | OUTPATIENT
Start: 2023-09-18

## 2023-10-09 ENCOUNTER — OFFICE VISIT (OUTPATIENT)
Dept: INTERNAL MEDICINE | Age: 63
End: 2023-10-09

## 2023-10-09 VITALS
SYSTOLIC BLOOD PRESSURE: 109 MMHG | BODY MASS INDEX: 25.83 KG/M2 | HEART RATE: 60 BPM | DIASTOLIC BLOOD PRESSURE: 73 MMHG | HEIGHT: 65 IN | TEMPERATURE: 97.6 F | WEIGHT: 155 LBS

## 2023-10-09 DIAGNOSIS — J06.9 UPPER RESPIRATORY VIRUS: Primary | ICD-10-CM

## 2023-10-09 PROCEDURE — 3074F SYST BP LT 130 MM HG: CPT | Performed by: STUDENT IN AN ORGANIZED HEALTH CARE EDUCATION/TRAINING PROGRAM

## 2023-10-09 PROCEDURE — 99213 OFFICE O/P EST LOW 20 MIN: CPT | Performed by: STUDENT IN AN ORGANIZED HEALTH CARE EDUCATION/TRAINING PROGRAM

## 2023-10-09 PROCEDURE — 3078F DIAST BP <80 MM HG: CPT | Performed by: STUDENT IN AN ORGANIZED HEALTH CARE EDUCATION/TRAINING PROGRAM

## 2023-10-09 PROCEDURE — 0241U COVID/FLU/RSV PANEL: CPT | Performed by: CLINICAL MEDICAL LABORATORY

## 2023-10-09 RX ORDER — BENZONATATE 100 MG/1
100 CAPSULE ORAL 3 TIMES DAILY PRN
Qty: 30 CAPSULE | Refills: 0 | Status: SHIPPED | OUTPATIENT
Start: 2023-10-09

## 2023-10-09 ASSESSMENT — ENCOUNTER SYMPTOMS
WOUND: 0
VOMITING: 0
SINUS PRESSURE: 1
FEVER: 0
EYE DISCHARGE: 0
RHINORRHEA: 1
EYE ITCHING: 0
COUGH: 1
SORE THROAT: 1
SHORTNESS OF BREATH: 0
NAUSEA: 0
DIARRHEA: 0
FATIGUE: 1
CHILLS: 0

## 2023-10-09 ASSESSMENT — PATIENT HEALTH QUESTIONNAIRE - PHQ9
2. FEELING DOWN, DEPRESSED OR HOPELESS: NOT AT ALL
SUM OF ALL RESPONSES TO PHQ9 QUESTIONS 1 AND 2: 0
CLINICAL INTERPRETATION OF PHQ2 SCORE: NO FURTHER SCREENING NEEDED
1. LITTLE INTEREST OR PLEASURE IN DOING THINGS: NOT AT ALL
SUM OF ALL RESPONSES TO PHQ9 QUESTIONS 1 AND 2: 0

## 2023-10-09 ASSESSMENT — PAIN SCALES - GENERAL: PAINLEVEL: 0

## 2023-10-10 LAB
FLUAV RNA RESP QL NAA+PROBE: NOT DETECTED
FLUBV RNA RESP QL NAA+PROBE: NOT DETECTED
RSV AG NPH QL IA.RAPID: NOT DETECTED
SARS-COV-2 RNA RESP QL NAA+PROBE: NOT DETECTED
SERVICE CMNT-IMP: NORMAL
SERVICE CMNT-IMP: NORMAL

## 2023-12-18 DIAGNOSIS — I10 ESSENTIAL HYPERTENSION: ICD-10-CM

## 2023-12-19 RX ORDER — LISINOPRIL 20 MG/1
20 TABLET ORAL DAILY
Qty: 90 TABLET | Refills: 0 | Status: SHIPPED | OUTPATIENT
Start: 2023-12-19

## 2024-02-05 ENCOUNTER — TELEPHONE (OUTPATIENT)
Dept: CARDIOLOGY | Age: 64
End: 2024-02-05

## 2024-02-05 ENCOUNTER — NURSE TRIAGE (OUTPATIENT)
Dept: TELEHEALTH | Age: 64
End: 2024-02-05

## 2024-02-28 NOTE — PAYOR COMM NOTE
--------------  ADMISSION REVIEW         1/7  ED    Upper Extremity Injury      Stated Complaint: Left Shoulder Pain     HPI     47-year-old female presents to the emergency department with complaints of pain in her upper thoracic region and some pain radi HPI   Chief Complaint   Patient presents with    Seizures     Called for pain, was A&Ox3 on EMS arrival, had a seizure in the back of the ambulance en route       Patient is a 48-year-old female who presents emergency department for evaluation of altered mental status, generalized pain and seizure.  Patient has a known history of cirrhosis that significant other called EMS today as patient was acting more confused and lethargic this morning.  She was complaining of generalized pain and was awake however sluggish to respond when EMS first arrived.  They note that when they got into the ambulance patient did have a witnessed seizure lasting roughly 1 minute however resolved on its own prior to any medication being given.  Uncertain whether patient has a history of seizures.  Patient very confused at this time and unable provide any further history.      History provided by:  EMS personnel                      Dhruv Coma Scale Score: 13                     Patient History   Past Medical History:   Diagnosis Date    Alcoholic hepatitis without ascites 08/13/2021    Alcoholic hepatitis    Hepatic encephalopathy (CMS/HCC) 06/09/2021    Hepatic encephalopathy     Past Surgical History:   Procedure Laterality Date    OTHER SURGICAL HISTORY  06/09/2021    Breast augmentation    US GUIDED ABDOMINAL PARACENTESIS  4/28/2021    US GUIDED ABDOMINAL PARACENTESIS [facility]    US GUIDED ABDOMINAL PARACENTESIS  4/23/2021    US GUIDED ABDOMINAL PARACENTESIS [facility]    US GUIDED ABDOMINAL PARACENTESIS  5/14/2021    US GUIDED ABDOMINAL PARACENTESIS [facility]    US GUIDED ABDOMINAL PARACENTESIS  8/17/2023    US GUIDED ABDOMINAL PARACENTESIS [facility]    US GUIDED ABDOMINAL PARACENTESIS  8/25/2023    US GUIDED ABDOMINAL PARACENTESIS [facility]    US GUIDED ABDOMINAL PARACENTESIS  10/23/2023    US GUIDED ABDOMINAL PARACENTESIS 10/23/2023 Huy Alvarez MD [facility]    US GUIDED  ABDOMINAL PARACENTESIS  10/27/2023    US GUIDED ABDOMINAL PARACENTESIS 10/27/2023 Huy Alvarez MD [facility] US    US GUIDED ABDOMINAL PARACENTESIS  10/26/2023    US GUIDED ABDOMINAL PARACENTESIS 10/26/2023 [facility]     US GUIDED ABDOMINAL PARACENTESIS  1/4/2024    US GUIDED ABDOMINAL PARACENTESIS 1/4/2024 [facility]     Family History   Problem Relation Name Age of Onset    Diabetes Mother      Uterine cancer Mother      Heart attack Father      Other (CHEMICAL DEPENDENCY) Father      Hypertension Father       Social History     Tobacco Use    Smoking status: Never    Smokeless tobacco: Never   Substance Use Topics    Alcohol use: Yes     Alcohol/week: 1.0 standard drink of alcohol     Types: 1 Glasses of wine per week     Comment: daily drinking    Drug use: Never       Physical Exam   ED Triage Vitals   Temp Pulse Resp BP   -- -- -- --      SpO2 Temp src Heart Rate Source Patient Position   -- -- -- --      BP Location FiO2 (%)     -- --       Physical Exam  Vitals and nursing note reviewed.   Constitutional:       General: She is not in acute distress.     Appearance: She is ill-appearing.   HENT:      Head: Normocephalic and atraumatic.      Mouth/Throat:      Mouth: Mucous membranes are moist.   Eyes:      Pupils: Pupils are equal, round, and reactive to light.      Comments: Significant scleral icterus bilaterally   Cardiovascular:      Rate and Rhythm: Normal rate and regular rhythm.   Pulmonary:      Effort: Pulmonary effort is normal. No respiratory distress.      Breath sounds: Normal breath sounds. No wheezing or rhonchi.   Abdominal:      General: Abdomen is flat. There is distension.      Tenderness: There is no abdominal tenderness. There is no guarding or rebound.   Musculoskeletal:         General: No swelling.   Skin:     General: Skin is dry.      Coloration: Skin is jaundiced.   Neurological:      Mental Status: She is lethargic.      GCS: GCS eye subscore is 4. GCS verbal subscore is 3. GCS motor subscore  components:     Neutrophil Absolute Prelim 8.46 (*)       Neutrophil Absolute 8.46 (*)       All other components within normal limits        THORACIC SPINE  Result Date: 1/7/2019  CONCLUSION:  Scoliotic and degenerative changes.        ED COURSE   Patient is 7.       Recent Results (from the past 24 hour(s))   CBC and Auto Differential    Collection Time: 02/28/24  6:35 AM   Result Value Ref Range    WBC 14.5 (H) 4.4 - 11.3 x10*3/uL    nRBC 0.2 (H) 0.0 - 0.0 /100 WBCs    RBC 2.01 (L) 4.00 - 5.20 x10*6/uL    Hemoglobin 8.0 (L) 12.0 - 16.0 g/dL    Hematocrit 23.9 (L) 36.0 - 46.0 %     (H) 80 - 100 fL    MCH 39.8 (H) 26.0 - 34.0 pg    MCHC 33.5 32.0 - 36.0 g/dL    RDW 17.9 (H) 11.5 - 14.5 %    Platelets 144 (L) 150 - 450 x10*3/uL    Neutrophils % 79.1 40.0 - 80.0 %    Immature Granulocytes %, Automated 1.1 (H) 0.0 - 0.9 %    Lymphocytes % 10.4 13.0 - 44.0 %    Monocytes % 9.1 2.0 - 10.0 %    Eosinophils % 0.1 0.0 - 6.0 %    Basophils % 0.2 0.0 - 2.0 %    Neutrophils Absolute 11.50 (H) 1.20 - 7.70 x10*3/uL    Immature Granulocytes Absolute, Automated 0.16 0.00 - 0.70 x10*3/uL    Lymphocytes Absolute 1.51 1.20 - 4.80 x10*3/uL    Monocytes Absolute 1.32 (H) 0.10 - 1.00 x10*3/uL    Eosinophils Absolute 0.01 0.00 - 0.70 x10*3/uL    Basophils Absolute 0.03 0.00 - 0.10 x10*3/uL   Lipase    Collection Time: 02/28/24  6:35 AM   Result Value Ref Range    Lipase <16 (L) 16 - 63 U/L   Basic metabolic panel    Collection Time: 02/28/24  6:35 AM   Result Value Ref Range    Glucose 168 (H) 65 - 99 mg/dL    Sodium 133 133 - 145 mmol/L    Potassium 2.7 (LL) 3.4 - 5.1 mmol/L    Chloride 90 (L) 97 - 107 mmol/L    Bicarbonate 26 24 - 31 mmol/L    Urea Nitrogen 7 (L) 8 - 25 mg/dL    Creatinine 0.50 0.40 - 1.60 mg/dL    eGFR >90 >60 mL/min/1.73m*2    Calcium 9.0 8.5 - 10.4 mg/dL    Anion Gap 17 <=19 mmol/L   Hepatic function panel    Collection Time: 02/28/24  6:35 AM   Result Value Ref Range    AST 94 (H) 5 - 40 U/L    ALT 26 5 - 40 U/L    Alkaline Phosphatase 200 (H) 35 - 125 U/L    Bilirubin, Total 18.6 (H) 0.1 - 1.2 mg/dL    Bilirubin, Direct 7.5 (H) 0.0 - 0.2 mg/dL    Total Protein 7.3 5.9 - 7.9 g/dL    Albumin 3.7 3.5 - 5.0 g/dL   Protime-INR    Collection Time: 02/28/24  6:35  AM   Result Value Ref Range    Protime 27.8 (H) 9.3 - 12.7 seconds    INR 2.8 (H) 0.9 - 1.2   APTT    Collection Time: 02/28/24  6:35 AM   Result Value Ref Range    aPTT 49.7 (H) 22.0 - 32.5 seconds   BLOOD GAS LACTIC ACID, VENOUS    Collection Time: 02/28/24  6:35 AM   Result Value Ref Range    POCT Lactate, Venous 4.0 (HH) 0.4 - 2.0 mmol/L   Ammonia    Collection Time: 02/28/24  6:35 AM   Result Value Ref Range    Ammonia 86 (H) 12 - 45 umol/L   Alcohol    Collection Time: 02/28/24  6:35 AM   Result Value Ref Range    Alcohol <0.010 0.000 - 0.010 g/dL   Sars-CoV-2 and Influenza A/B PCR    Collection Time: 02/28/24  6:39 AM   Result Value Ref Range    Flu A Result Not Detected Not Detected    Flu B Result Not Detected Not Detected    Coronavirus 2019, PCR Not Detected Not Detected       ED Course & MDM   ED Course as of 02/28/24 0750   Wed Feb 28, 2024   0706 CT imaging at this time does show what appears to be a large hyperdensity within the left ventricle concerning for subarachnoid hemorrhage.  Patient is more awake at this time and answering questions appropriately although is still slightly confused.  Consult placed to neurosurgery for further recommendations.  Given her seizure with intracranial hemorrhage she was given 1 g Keppra IV. [JL]   0730 In discussion with both neurosurgery and as well as patient's power of  they agree that there is nothing more to offer the patient at this time as she is a DNR/DNI and would not want any aggressive treatment.  In discussion with patient's sister who is her power of  patient will be made DNR comfort care at this time and admitted for hospice evaluation [JL]      ED Course User Index  [JL] Crispin Lea DO         Diagnoses as of 02/28/24 0750   Seizure (CMS/HCC)   Alcoholic cirrhosis, unspecified whether ascites present (CMS/HCC)   Subarachnoid hemorrhage (CMS/HCC)       Medical Decision Making  Patient is a 48-year-old female that presented to the  emergency department for evaluation of altered mental status, pain.  Patient very lethargic on initial presentation with confused response however she is opening her eyes and moving all extremities equally.  She is very jaundiced with scleral icterus present however reviewing patient's chart she does have a history of cirrhosis and bilirubin from 1 month prior was 14.  Blood work ordered including CBC, CMP, PT/INR, PTT, lactic acid, ammonia, alcohol level.  EKG also ordered at this time along with COVID-19/influenza testing.  Given patient's persistent altered mental status with an unknown history of seizures CT scan of the brain was ordered along with chest x-ray.  CT scan of the brain does show a massive subarachnoid hemorrhage.  In discussion with neurosurgery and in further chart review patient is a DNR Comfort Care arrest.  They do not feel they is anything to offer this patient at this time as she is not a surgical candidate given her DNR and the patient has a very poor prognosis status.  In discussion with patient's sister who is power of  she agrees patient would not want anything aggressive and did already have noticed that she may have only 6 months left to live due to her end-stage alcoholic cirrhosis.  Patient would not want to be intubated.  In keeping with patient's wishes she will be admitted for hospice and palliative care evaluation.  I discussed case with hospitalist who accepted patient for admission for hospice evaluation and further comfort measures.    CRITICAL CARE NOTE:   Upon my evaluation, this patient had a high probability of imminent or life-threatening deterioration due to subarachnoid hemorrhage, end-stage alcoholic cirrhosis, which required my direct attention, intervention, and personal management    30 total minutes of critical care were personally provided which excludes all other billable procedures. This was for time at the bedside, re-evaluations, interpretation of lab  and imaging results, discussions with consultants, and monitoring for potential decompensation. Intervention were performed as documented above.        Procedure  Procedures     Crispin Lea DO  02/28/24 0756

## 2024-03-13 DIAGNOSIS — I10 ESSENTIAL HYPERTENSION: ICD-10-CM

## 2024-03-14 RX ORDER — LISINOPRIL 20 MG/1
20 TABLET ORAL DAILY
Qty: 30 TABLET | Refills: 0 | Status: SHIPPED | OUTPATIENT
Start: 2024-03-14

## 2024-03-21 ENCOUNTER — OFFICE VISIT (OUTPATIENT)
Dept: FAMILY MEDICINE CLINIC | Facility: CLINIC | Age: 64
End: 2024-03-21
Payer: COMMERCIAL

## 2024-03-21 VITALS
TEMPERATURE: 97 F | RESPIRATION RATE: 16 BRPM | HEART RATE: 96 BPM | BODY MASS INDEX: 25.81 KG/M2 | WEIGHT: 160.63 LBS | DIASTOLIC BLOOD PRESSURE: 68 MMHG | SYSTOLIC BLOOD PRESSURE: 128 MMHG | HEIGHT: 66 IN

## 2024-03-21 DIAGNOSIS — Z00.00 WELL WOMAN EXAM WITHOUT GYNECOLOGICAL EXAM: Primary | ICD-10-CM

## 2024-03-21 DIAGNOSIS — I10 ESSENTIAL HYPERTENSION: ICD-10-CM

## 2024-03-21 DIAGNOSIS — J30.89 NON-SEASONAL ALLERGIC RHINITIS, UNSPECIFIED TRIGGER: ICD-10-CM

## 2024-03-21 PROCEDURE — 99386 PREV VISIT NEW AGE 40-64: CPT | Performed by: FAMILY MEDICINE

## 2024-03-21 RX ORDER — LISINOPRIL 20 MG/1
20 TABLET ORAL DAILY
COMMUNITY
Start: 2021-03-29

## 2024-03-21 RX ORDER — FLUTICASONE PROPIONATE 50 MCG
SPRAY, SUSPENSION (ML) NASAL AS DIRECTED
COMMUNITY
Start: 2020-12-02

## 2024-03-21 RX ORDER — FLUTICASONE PROPIONATE 50 MCG
1 SPRAY, SUSPENSION (ML) NASAL 2 TIMES DAILY
Qty: 16 EACH | Refills: 11 | Status: SHIPPED | OUTPATIENT
Start: 2024-03-21 | End: 2025-03-16

## 2024-03-21 NOTE — PROGRESS NOTES
SUBJECTIVE:  Chief Complaint   Patient presents with    [facility]     Est care    Physical     WWE/ no pap  Congested/sneezing x 2 months      HPI:  Congestion/sneezing- Present for the past several months. Will get nasal congestion and post nasal drip. Increased sneezing. Rhinitis at nighttime. Severity will fluctuate. Daytime is improved. Had covid for the first time a few months ago but had symptoms prior to this. No clear allergies in the past. Takes claritin daily. Was previously using flonase but has stopped more recently.     Does note some weight gain. Attributes to stress. Not able to get as much exercise as previous. Is trying to get back into regular exercise.      Health Maintenance:  Vaccines: reviewed as below. Indicated today: none  Immunization History   Administered Date(s) Administered    >=3 YRS FLUZONE OR FLUARIX QUAD PRESERVE FREE SINGLE DOSE (20839) FLU CLINIC 10/14/2015, 10/25/2016    >=3 YRS TRI  MULTIDOSE VIAL (75138) FLU CLINIC 10/28/2014    Covid-19 Vaccine Pfizer 30 mcg/0.3 ml 03/16/2021, 04/13/2021    FLU VAC QIV SPLIT 3 YRS AND OLDER (44597) 10/30/2018    FLUZONE 6 months and older PFS 0.5 ml (18293) 10/14/2015, 10/25/2016, 10/30/2018, 10/18/2019, 10/02/2020, 11/06/2021    Fluvirin, 3 Years & >, Im 08/30/2017    Hep A, Adult 08/29/2017    Influenza 10/16/2013    TDAP 05/13/2016, 06/21/2016     Obesity screening: Body mass index is 25.92 kg/m².  Diabetes screening:  due  Hypercholesterolemia screening:   Lab Results   Component Value Date    HDL 69 05/12/2016    HDL 63 01/27/2015     Lab Results   Component Value Date    LDL 97 05/12/2016    LDL 88 01/27/2015     Lab Results   Component Value Date    TRIG 69 05/12/2016    TRIG 79 01/27/2015        Depression screen: notes that she is managing well  Osteoporosis: No history of pathologic fractures. No steroid use, smoking, risk of falls, excessive alcohol use.  Cervical Cancer screening: Last Pap: 2023    History of Abnormal Pap?  Yes- has hx of LEEP  Colon Cancer screening: Family history of colon cancer? no Last colonoscopy: 1/8/2016 ([facility])  Breast Cancer screening:  Last mammogram: 4/2023  STI: Desires testing for STIs? no  Tobacco use:  reports that she has never smoked. She has never used smokeless tobacco.    ROS: Negative unless stated above prescriptions to get started on his iMessages, coming better of course the pharmacist is on break from 12/30/2021 will AT times I would need to be enough to wait till after work today    HISTORY:  Past Medical History:   Diagnosis Date    Essential hypertension       Past Surgical History:   Procedure Laterality Date    COLONOSCOPY  1997    OTHER SURGICAL HISTORY  2000    partial removal of cervix      Family History   Problem Relation Age of Onset    Arrhythmia Mother     Other (atrial fibrillation) Mother     Heart Attack Father 61    Hypertension Brother     Stroke Maternal Grandmother       Social History     Socioeconomic History    Marital status:    Tobacco Use    Smoking status: Never    Smokeless tobacco: Never   Vaping Use    Vaping Use: Never used   Substance and Sexual Activity    Alcohol use: Yes     Alcohol/week: 1.0 standard drink of alcohol     Types: 1 Standard drinks or equivalent per week     Comment: less than one a week    Drug use: No   Other Topics Concern    Caffeine Concern Yes     Comment: 1 cup coffee daily    Exercise Yes     Comment: 4x a week    Self-Exams No        Allergies:  Allergies   Allergen Reactions    Contrast Dye [Gadolinium Derivatives] SWELLING     From mri       OBJECTIVE:  PHYSICAL EXAM:  Vitals:    03/21/24 1037   BP: 128/68   Pulse: 96   Resp: 16   Temp: 97.1 °F (36.2 °C)   Weight: 160 lb 9.6 oz (72.8 kg)   Height: 5' 6\" (1.676 m)     Physical Examination: General appearance - alert, well appearing, and in no distress and normal appearing weight  Mental status - alert, oriented to person, place, and time, normal mood, behavior,  speech, dress, motor activity, and thought processes  Ears - bilateral TM's and external ear canals normal  Neck - supple, no significant adenopathy  Chest - clear to auscultation, no wheezes, rales or rhonchi, symmetric air entry  Heart - normal rate, regular rhythm, normal S1, S2, no murmurs, rubs, clicks or gallops  Abdomen - soft, nontender, nondistended, no masses or organomegaly  Extremities - there would beperipheral pulses normal, no pedal edema, no clubbing or cyanosis    ASSESSMENT & PLAN:  Natalie Storm is a 63 year old female is here for Establish Care (Est care) and Physical (WWE/ no pap/Congested/sneezing x 2 months )    Problem List Items Addressed This Visit          Cardiac and Vasculature    Essential hypertension    Relevant Medications    lisinopril 20 MG Oral Tab     Other Visit Diagnoses       Well woman exam without gynecological exam    -  Primary    Relevant Medications    lisinopril 20 MG Oral Tab    Other Relevant Orders    CBC With Differential With Platelet    Comp Metabolic Panel (14)    Lipid Panel    TSH W Reflex To Free T4    Non-seasonal allergic rhinitis, unspecified trigger        Relevant Medications    fluticasone propionate 50 MCG/ACT Nasal Suspension    fluticasone propionate 50 MCG/ACT Nasal Suspension            Natalie was seen today for establish care and physical.    Diagnoses and all orders for this visit:    Well woman exam without gynecological exam  Routine health maintenance reviewed, discussed and updated as below. This includes: labs. Healthy diet and exercise reviewed.   -     CBC With Differential With Platelet; Future  -     Comp Metabolic Panel (14); Future  -     Lipid Panel; Future  -     TSH W Reflex To Free T4; Future    Non-seasonal allergic rhinitis, unspecified trigger  -     Change antihistamine and trial fluticasone propionate 50 MCG/ACT Nasal Suspension; 1 spray by Each Nare route in the morning and 1 spray before bedtime.    Essential  hypertension  Normotensive today. Will cont current therapy    Call or return to clinic prn if these symptoms worsen or fail to improve as anticipated. RTC in 1 year.   Barbara Villanueva DO  3/21/2024 10:55 AM    Note to Patient  The 21st Century Cures Act makes medical notes like these available to patients in the interest of transparency. However, be advised this is a medical document and is intended as [id number] communication; it is written in medical language and may appear blunt, direct, or contain abbreviations or verbiage that are unfamiliar. Medical documents are intended to carry relevant information, facts as evident, and the clinical opinion of the practitioner.     This report has been produced using speech recognition software, and may contain errors related to grammar, punctuation, spelling, words or phrases unrecognized or not translated appropriately to text; these errors may be referred to the dictating provider for further clarification and/or addendum as needed.

## 2024-04-16 ENCOUNTER — LAB ENCOUNTER (OUTPATIENT)
Dept: LAB | Age: 64
End: 2024-04-16
Attending: FAMILY MEDICINE
Payer: COMMERCIAL

## 2024-04-16 DIAGNOSIS — Z00.00 WELL WOMAN EXAM WITHOUT GYNECOLOGICAL EXAM: ICD-10-CM

## 2024-04-16 LAB
ALBUMIN SERPL-MCNC: 3.6 G/DL (ref 3.4–5)
ALBUMIN/GLOB SERPL: 1.1 {RATIO} (ref 1–2)
ALP LIVER SERPL-CCNC: 77 U/L
ALT SERPL-CCNC: 19 U/L
ANION GAP SERPL CALC-SCNC: 9 MMOL/L (ref 0–18)
AST SERPL-CCNC: 10 U/L (ref 15–37)
BASOPHILS # BLD AUTO: 0.05 X10(3) UL (ref 0–0.2)
BASOPHILS NFR BLD AUTO: 1.1 %
BILIRUB SERPL-MCNC: 0.6 MG/DL (ref 0.1–2)
BUN BLD-MCNC: 19 MG/DL (ref 9–23)
CALCIUM BLD-MCNC: 10 MG/DL (ref 8.5–10.1)
CHLORIDE SERPL-SCNC: 109 MMOL/L (ref 98–112)
CHOLEST SERPL-MCNC: 179 MG/DL (ref ?–200)
CO2 SERPL-SCNC: 24 MMOL/L (ref 21–32)
CREAT BLD-MCNC: 0.75 MG/DL
EGFRCR SERPLBLD CKD-EPI 2021: 91 ML/MIN/1.73M2 (ref 60–?)
EOSINOPHIL # BLD AUTO: 0.18 X10(3) UL (ref 0–0.7)
EOSINOPHIL NFR BLD AUTO: 3.9 %
ERYTHROCYTE [DISTWIDTH] IN BLOOD BY AUTOMATED COUNT: 12.5 %
FASTING PATIENT LIPID ANSWER: YES
FASTING STATUS PATIENT QL REPORTED: YES
GLOBULIN PLAS-MCNC: 3.2 G/DL (ref 2.8–4.4)
GLUCOSE BLD-MCNC: 94 MG/DL (ref 70–99)
HCT VFR BLD AUTO: 39.8 %
HDLC SERPL-MCNC: 57 MG/DL (ref 40–59)
HGB BLD-MCNC: 13.1 G/DL
IMM GRANULOCYTES # BLD AUTO: 0.01 X10(3) UL (ref 0–1)
IMM GRANULOCYTES NFR BLD: 0.2 %
LDLC SERPL CALC-MCNC: 105 MG/DL (ref ?–100)
LYMPHOCYTES # BLD AUTO: 1.56 X10(3) UL (ref 1–4)
LYMPHOCYTES NFR BLD AUTO: 33.8 %
MCH RBC QN AUTO: 30.4 PG (ref 26–34)
MCHC RBC AUTO-ENTMCNC: 32.9 G/DL (ref 31–37)
MCV RBC AUTO: 92.3 FL
MONOCYTES # BLD AUTO: 0.44 X10(3) UL (ref 0.1–1)
MONOCYTES NFR BLD AUTO: 9.5 %
NEUTROPHILS # BLD AUTO: 2.37 X10 (3) UL (ref 1.5–7.7)
NEUTROPHILS # BLD AUTO: 2.37 X10(3) UL (ref 1.5–7.7)
NEUTROPHILS NFR BLD AUTO: 51.5 %
NONHDLC SERPL-MCNC: 122 MG/DL (ref ?–130)
OSMOLALITY SERPL CALC.SUM OF ELEC: 296 MOSM/KG (ref 275–295)
PLATELET # BLD AUTO: 184 10(3)UL (ref 150–450)
POTASSIUM SERPL-SCNC: 4.1 MMOL/L (ref 3.5–5.1)
PROT SERPL-MCNC: 6.8 G/DL (ref 6.4–8.2)
RBC # BLD AUTO: 4.31 X10(6)UL
SODIUM SERPL-SCNC: 142 MMOL/L (ref 136–145)
TRIGL SERPL-MCNC: 91 MG/DL (ref 30–149)
TSI SER-ACNC: 2.15 MIU/ML (ref 0.36–3.74)
VLDLC SERPL CALC-MCNC: 15 MG/DL (ref 0–30)
WBC # BLD AUTO: 4.6 X10(3) UL (ref 4–11)

## 2024-04-16 PROCEDURE — 85025 COMPLETE CBC W/AUTO DIFF WBC: CPT

## 2024-04-16 PROCEDURE — 84443 ASSAY THYROID STIM HORMONE: CPT

## 2024-04-16 PROCEDURE — 36415 COLL VENOUS BLD VENIPUNCTURE: CPT

## 2024-04-16 PROCEDURE — 80061 LIPID PANEL: CPT

## 2024-04-16 PROCEDURE — 80053 COMPREHEN METABOLIC PANEL: CPT

## 2024-04-19 ENCOUNTER — TELEPHONE (OUTPATIENT)
Dept: FAMILY MEDICINE CLINIC | Facility: CLINIC | Age: 64
End: 2024-04-19

## 2024-04-19 DIAGNOSIS — I10 ESSENTIAL HYPERTENSION: Primary | ICD-10-CM

## 2024-04-19 RX ORDER — LISINOPRIL 20 MG/1
20 TABLET ORAL DAILY
Qty: 90 TABLET | Refills: 1 | Status: SHIPPED | OUTPATIENT
Start: 2024-04-19

## 2024-04-19 NOTE — TELEPHONE ENCOUNTER
Pt is calling she needs her lisinopril 20 MG Oral Tab refilled to [id number] - [place], [place] - [id number] AT [facility] [address], [phone], [phone] [[id number]]

## 2024-04-19 NOTE — TELEPHONE ENCOUNTER
Future Appointments   Date Time Provider Department Center   4/20/2024  7:40 AM [facility]

## 2024-04-19 NOTE — TELEPHONE ENCOUNTER
Medication refilled per protocol.     Requested Prescriptions     Signed Prescriptions Disp Refills    lisinopril 20 MG Oral Tab 90 tablet 1     Sig: Take 1 tablet (20 mg total) by mouth daily.     Authorizing Provider: LISSA ALATORRE     Ordering User: GISELA OCAMPO 3/21/2024     Future Appointments   Date Time Provider Department Center   4/20/2024  7:40 AM [facility]        Pt notified.

## 2024-04-20 ENCOUNTER — HOSPITAL ENCOUNTER (OUTPATIENT)
Dept: MAMMOGRAPHY | Facility: HOSPITAL | Age: 64
Discharge: HOME | End: 2024-04-20
Attending: FAMILY MEDICINE
Payer: COMMERCIAL

## 2024-04-20 DIAGNOSIS — Z12.31 ENCOUNTER FOR SCREENING MAMMOGRAM FOR MALIGNANT NEOPLASM OF BREAST: ICD-10-CM

## 2024-04-20 PROCEDURE — 77063 BREAST TOMOSYNTHESIS BI: CPT | Performed by: FAMILY MEDICINE

## 2024-04-20 PROCEDURE — 77067 SCR MAMMO BI INCL CAD: CPT | Performed by: FAMILY MEDICINE

## 2024-04-24 ENCOUNTER — TELEPHONE (OUTPATIENT)
Dept: MAMMOGRAPHY | Age: 64
End: 2024-04-24

## 2024-10-07 ENCOUNTER — TELEPHONE (OUTPATIENT)
Dept: FAMILY MEDICINE CLINIC | Facility: CLINIC | Age: 64
End: 2024-10-07

## 2024-10-07 DIAGNOSIS — I10 ESSENTIAL HYPERTENSION: ICD-10-CM

## 2024-10-07 RX ORDER — LISINOPRIL 20 MG/1
20 TABLET ORAL DAILY
Qty: 90 TABLET | Refills: 1 | Status: SHIPPED | OUTPATIENT
Start: 2024-10-07

## 2024-10-07 RX ORDER — LISINOPRIL 20 MG/1
20 TABLET ORAL DAILY
Qty: 30 TABLET | Refills: 0 | OUTPATIENT
Start: 2024-10-07

## 2024-10-07 NOTE — TELEPHONE ENCOUNTER
Requested Prescriptions     Pending Prescriptions Disp Refills    lisinopril 20 MG Oral Tab 90 tablet 1     Sig: Take 1 tablet (20 mg total) by mouth daily.     LOV 3/21/2024     Patient was asked to follow-up in: 1 year    Appointment scheduled: Visit date not found     Medication refilled per protocol.

## 2024-10-07 NOTE — TELEPHONE ENCOUNTER
Pt is new patient of Dr. Villanueva and she needs her Lisinopril refilled to [facility]; [facility] sent it to the wrong provider to refill

## 2025-04-02 ENCOUNTER — OFFICE VISIT (OUTPATIENT)
Dept: INTERNAL MEDICINE CLINIC | Facility: CLINIC | Age: 65
End: 2025-04-02
Payer: COMMERCIAL

## 2025-04-02 VITALS
SYSTOLIC BLOOD PRESSURE: 110 MMHG | HEIGHT: 65.5 IN | DIASTOLIC BLOOD PRESSURE: 80 MMHG | BODY MASS INDEX: 27 KG/M2 | RESPIRATION RATE: 16 BRPM | HEART RATE: 60 BPM | OXYGEN SATURATION: 99 % | WEIGHT: 164 LBS

## 2025-04-02 DIAGNOSIS — Z78.0 MENOPAUSE: ICD-10-CM

## 2025-04-02 DIAGNOSIS — R63.5 WEIGHT GAIN: ICD-10-CM

## 2025-04-02 DIAGNOSIS — F43.9 STRESS: ICD-10-CM

## 2025-04-02 DIAGNOSIS — I10 ESSENTIAL HYPERTENSION: ICD-10-CM

## 2025-04-02 DIAGNOSIS — E66.3 OVERWEIGHT (BMI 25.0-29.9): ICD-10-CM

## 2025-04-02 DIAGNOSIS — Z51.81 ENCOUNTER FOR THERAPEUTIC DRUG MONITORING: Primary | ICD-10-CM

## 2025-04-02 RX ORDER — PHENTERMINE HYDROCHLORIDE 15 MG/1
15 CAPSULE ORAL EVERY MORNING
Qty: 30 CAPSULE | Refills: 3 | Status: SHIPPED | OUTPATIENT
Start: 2025-04-02

## 2025-04-02 RX ORDER — DEXAMETHASONE 2 MG/1
TABLET ORAL
COMMUNITY
End: 2025-04-02

## 2025-04-02 RX ORDER — SULFAMETHOXAZOLE AND TRIMETHOPRIM 800; 160 MG/1; MG/1
1 TABLET ORAL EVERY 12 HOURS
COMMUNITY
End: 2025-04-02

## 2025-04-02 RX ORDER — IBUPROFEN 800 MG/1
TABLET, FILM COATED ORAL
COMMUNITY
End: 2025-04-02

## 2025-04-02 RX ORDER — MINERAL OIL/PETROLATUM,WHITE 42.5-57.3%
OINTMENT (GRAM) OPHTHALMIC (EYE)
COMMUNITY

## 2025-04-02 RX ORDER — NITROFURANTOIN 25; 75 MG/1; MG/1
CAPSULE ORAL
COMMUNITY
Start: 2024-12-02

## 2025-04-02 RX ORDER — CEPHALEXIN 500 MG/1
CAPSULE ORAL
COMMUNITY
End: 2025-04-02 | Stop reason: ALTCHOICE

## 2025-04-02 RX ORDER — BENZONATATE 100 MG/1
1 CAPSULE ORAL 3 TIMES DAILY PRN
COMMUNITY
Start: 2023-10-09

## 2025-04-02 RX ORDER — VALACYCLOVIR HYDROCHLORIDE 500 MG/1
1 TABLET, FILM COATED ORAL 2 TIMES DAILY
COMMUNITY
End: 2025-04-02 | Stop reason: ALTCHOICE

## 2025-04-02 RX ORDER — CLOBETASOL PROPIONATE 0.5 MG/G
OINTMENT TOPICAL
COMMUNITY
End: 2025-04-02 | Stop reason: ALTCHOICE

## 2025-04-02 RX ORDER — CRANBERRY FRUIT EXTRACT 250 MG
CAPSULE ORAL
COMMUNITY

## 2025-04-02 RX ORDER — FLUCONAZOLE 150 MG/1
1 TABLET ORAL
COMMUNITY

## 2025-04-02 NOTE — PATIENT INSTRUCTIONS
Welcome to the [facility]...your Lifestyle Renovation begins now!  Thank you for placing your trust in our health care team, I look forward to working with you along this journey to better health!    Next steps:     1.  Call our office at [phone] to schedule a personal nutrition consultation with one of our registered dieticians, Solomon Márquez. Bring along your food journal (3 days minimum). See journal options below.  2.  Complete non fasting labs at [facility] lab site prior to next office visit. Lab results will be communicated via Eco [employer].  3.  Fill your prescribed medication and take as discussed and prescribed: Start Phentermine daily.      Please try to work on the following dietary changes this first month:    1.  Drink water with meals and throughout the day, cut down on soda and/or juice if consumed. Consider flavored water options like Bubbly, Spindrift, Hint and Gunnar. Reduce alcohol servings to 4 per week maximum.  2.  Have protein with each meal, examples include: greek yogurt, cottage cheese, hard boiled egg, tofu, chicken, fish, or tuna.   3.  Work towards reducing/eliminating refined carbohydrates and sugars which includes items such as sweets, as well as rice, pasta, and bread and make sure to choose whole grain options when having them with just 1 serving per meal about the size of your inner palm.  4.  Consume non starchy veggies daily working towards making them a good 50% of your daily food intake. Add them to lunch and dinner consistently.  5.  Start a daily probiotic: VSL#3 is recommended, (order on line at www.vsl3.com). Take 1 capsule daily with water for 30 days, then reduce to 1 every other day (this will reduce the cost). Capsules can be left out of refrigerator for 2 weeks. I recommend using a pill box weekly and keeping the bottle in the fridge.    Please download duane My Fitness Pal, LoseIt! Or My Net Diary to monitor daily dietary intake and  you will be able to see if you are eating the right amount of calories or too much or too little which would hinder weight loss. Additionally this will help to see your daily carbohydrate and protein intake. When you set the yaquelin up choose 1.5 lbs/week as a goal.  Keeping a paper food journal is an option as well to remain accountable for your choices- this is the start to mindful eating! A low calorie diet has been consistently shown to support weight loss.    Continue or start exercising to help establish a routine. If not already exercising begin with 1 day/week and progress as able with the goal of working towards 30 minutes 5 days a week at a minimum. A variety or cardio, strength and stretching is important. Review resources below to help support you in building this healthy routine.    Meditation daily can help manage and control stress. Chronic stress can make weight loss difficult.  Exercising is one way to help with stress, but meditation using the CALM Yaquelin or another comparable alternative can be done in your home or place of work with little time commitment. This Yaquelin can also help work on behavior change and improve sleep. Check out the segment under Calm Masterclass and listen to The 4 Pillars of Health. A great way to begin learning about the foundation of lifestyle with practical tips to use in your every day. In addition, we offer counseling services and support for individual connection and care. A referral is necessary so please let me know if this is a service you are interested.    Check out www.yourweightmatters.org blog for continued support and education along your weight loss journey to optimal health!      Patient Resources:    Personal Training/Fitness Classes/Health Coaching    [facility] in [place]: [facility] fitness center with group fitness and personal training located in [place].  Health Coaching with Liliana Amin, Efraín Cantu, and Saw Todd at our [facility]  Center- individual coaching to work on your health goals. Call [phone] and/or email @ [email or web address]. Free 60 minute consult when client of [facility].  NOLAN Bower @ [email or web address]. A variety of group fitness options plus various yoga classes [phone] and/or email Abeba at [email or web address]  [facility] Centers with multiple locations: ConnectSolutions (www.Momspot), Pacer Electronics5 Training (www.Fusion-io), J & R Renovations Body Bootcamp (www.SocialEnginebodyVibryntp.Peakos), Get10 (www.Examify), The Exercise  (www.exercisecoach.com), Club PilRoot Metrics (www.App47)    Online Fitness  Fitness  on Kickanotch mobile  Fit in 10 DVD series   www.wkzgf11MKDKace Networks  Chair exercises via Sit and Be Fit (www.sitandChannel Medsystems.Bargain Technologies) and DraftDay (www.NAME'S Online Department Store) or Martin Suazo or Anderson Simon videos on YouTube.  Hip Hop Fit with Gallo Goddardks at www.hiphopfit.Zero9    Apps for on the Go Fitness  [place] 7 Minute Workout (orange box with white 7) - free on the go HIIT training yaquelin  Peloton Yaquelin @ www.onepeloton.com    Nutrition Trackers, Meal Preparation, and Other Meal Programs  LoseIT! And My Fitness Pal apps and on line for tracking nutrition  NOOM - virtual health coaching  FitFoundation (healthy meals on the go) in [place] @ www.nnwhtlrbfiprg6f.Peakos  Ankit GARCIA @ www.bistromd.Peakos and Okrwax18 (calorie smart and low carb plans recommended) @ www.rcgpip86.com, Metabolic Meals @ www.MyMetabolicMeals.com - individual prepared meals to go  Gobble, Blue Apron, Home , Every Plate, Sunbasket- on line meal delivery programs for preparation at home  Meal Village in [place] for homemade meals to go @ www.mealvillage.com  Diet Doctor @ www.dietdoctor.com - low carb swaps  ReciMe and Mealime yaquelin (grocery and meal planning)    Stress, Anxiety, Depression, Trauma  CALM meditation yaquelin (www.calm.com)  Headspace  Don't let anxiety run your  life. Using the science of emotion regulation and mindfulness to overcome fear and worry by Jordy Lion PsyD and Neftali Velazco MA.  The [employer] Podcast (September 27, 2023): 6 Magic Words That Stop Anxiety  What Happened to You?- a look at the impact trauma has on behavior written by Ernesto Gaston and Dr. Sorin Barros  Whole Again by Art Sepulveda - discovering your true self after trauma    Mindful Eating/The Hungry Brain  Am I Hungry? Mindful eating virtual  duane (www.amihungry.com)  The Hungry Brain by Yashira Farfan, PhD  Mindless Eating by Dmitry Lloyd  Weight Loss Surgery Will Not Treat Food Addiction by Dian Young Ph.D    Metabolic Dysfunction, Hormones and Cravings  Why We Get Sick? By Raul Bone (insulin resistance)  Your Body in Balance: The New Science of Food, Hormones, and Health by Dr. Parker Brandt  The Complete Guide to fasting by Dr. Almaraz  Fast Like a Girl by Dr. Luna Holloway  The M Factor (documentary on PBS about Menopause)  Sugar, Salt & Fat by Bettie Shannon, Ph.D, R.D.  The Truth About Sugar - documentary on sugar (Free on Emotify, https://youOutSmart Power Systemsu.be/3G9bvndGY1d)  Presentation on SUGAR called Sugar: The Bitter Truth by Dr. Abhilash Marley (Emotify) https://youtu.be/[email or web address]-oM?[email or web address]  Reverse Visceral Fat: #1 Way to Increase Your Lifespan & End Inflammation with Dr. Bryan Dunlap on Utube @ https://youOutSmart Power Systemsu.be/nupPRnvUpJY?[email or web address]    Nutrition Support  You Are What You Eat - Netfix series on twin study looking at impact of nutrition changes on health  The End of Dieting: How to Live for Life by Dr. Layton Fuchs M.D. or listen to The POINT 3 Basketball Podcast Episode 63: Understanding \"Nutritarian\" Eating w/Dr. Layton Fuchs  The Game Changers- Netflix Documentary on plant based nutrition  The Dr. Collado T5 Wellness Plan by Dr. Dani Collado MD  The Complete Guide to fasting by Dr. Almaraz  @[facility] ([facility] Dietician with support surrounding nutrition  and meal prep/planning)    Education, Motivation and Support Resources  Live to 100: Secrets of the Blue Zones - Netflix series on the secrets to communities living over 100 years old  Atomic Habits by Manny Woodard (a book about taking small steps to promote greater behavior change)   Motivation duane (black box with white \")- daily supportive messages sent to your phone  Can't Hurt Me by Jordy Gardiner (a book exploring the power of discipline in achieving your goals)  Fed Up - documentary about obesity (Free on Utube)  Www.yourweightmatters.org - Obesity Action Coalition sponsored Blog posts  Obesity Action Coalition Resources on topics specific to weight management (www.obesityaction.org)  Fitlosophy Fitspiration - journal to better health (journal book found at [employer] in fitness aisle)  Matthew Carranza talk titled: The Call to Courage (Netflix)  The Exam Room by the Physician's Committee (Podcast)  Nutrition Facts by Dr. Victoria (Podcast)      Balanced Nutrition includes:     Build the mentality of Food 4 Fuel. Clean eating with whole foods and eliminating/reducing ultra processed foods.  Be an intuitive eater and using mindful eating practices.  Eat a balanced plate with protein and produce at all meals: 1/4 plate- protein, 1/2 plate non starchy veggies, and 1/4 plate fruit or complex carbohydrate.  Drink water with all meals and use a salad plate to naturally reduce portions.  Eliminate/reduce late night eating by stopping after 7pm. Allowing your body to fast for 12 hours (drink only water, tea or black coffee without any additives).              What are proteins?  Proteins are one of three primary macronutrients that provide energy to the human body, along with fats and carbohydrates. Proteins are also responsible for a large portion of the work that is done in cells; they are necessary for proper structure and function of tissues and organs, and also act to regulate them. They are comprised of a number of amino  acids that are essential to proper body function, and serve as the building blocks of body tissue.  There are 20 different amino acids in total, and the sequence of amino acids determines a protein's structure and function. While some amino acids can be synthesized in the body, there are 9 amino acids that humans can only obtain from dietary sources (insufficient amounts of which may sometimes result in death), termed essential amino acids. Foods that provide all of the essential amino acids are called complete protein sources, and include both animal (meat, dairy, eggs, fish) as well as plant-based sources (soy, quinoa, buckwheat).    Proteins can be categorized based on the function they provide to the body. Below is a list of some types of proteins:  Antibody--proteins that protect the body from foreign particles, such as viruses and bacteria, by binding to them  Enzyme--proteins that help form new molecules as well as perform the many chemical reactions that occur throughout the body  Messenger--proteins that transmit signals throughout the body to maintain body processes  Structural component--proteins that act as building blocks for cells that ultimately allow the body to move  Transport/storage--proteins that move molecules throughout the body  As can be seen, proteins have many important roles throughout the body, and as such, it is important to provide sufficient nutrition to the body to maintain healthy protein levels.    How much protein do I need?  The amount of protein that the human body requires daily is dependent on many conditions, including overall energy intake, growth of the individual, and physical activity level. It is often estimated based on body weight, as a percentage of total caloric intake (10-35%), or based on age alone. 0.8g/kg of body weight is a commonly cited recommended dietary allowance (RDA). This value is the minimum recommended value to maintain basic nutritional requirements,  but consuming more protein, up to a certain point, maybe beneficial, depending on the sources of the protein.  The recommended range of protein intake is between 0.8 g/kg and 1.8 g/kg of body weight, dependent on the many factors listed above. People who are highly active, or who wish to build more muscle should generally consume more protein. Some sources suggest consuming between 1.8 to 2 g/kg for those who are highly active. The amount of protein a person should consume, to date, is not an exact science, and each individual should consult a specialist, be it a dietitian, doctor, or , to help determine their individual needs. I recommend your daily protein intake to be 90 grams/day.    Foods high in protein  There are many different combinations of food that a person can eat to meet their protein intake requirements. For many people, a large portion of protein intake comes from meat and dairy, though it is possible to get enough protein while meeting certain dietary restrictions you might have. Generally, it is easier to meet your RDA of protein by consuming meat and dairy, but an excess of either can have a negative health impact. There are plenty of plant-based protein options, but they generally contain less protein in a given serving. Ideally, a person should consume a mixture of meat, dairy, and plant-based foods in order to meet their RDA and have a balanced diet replete with nutrients.    If possible, consuming a variety of complete proteins is recommended. A complete protein is a protein that contains a good amount of each of the nine essential amino acids required in the human diet. Examples of complete protein foods or meals include:    Meat/Dairy examples  Eggs  Chicken breast  Cottage cheese  Greek yogurt  Milk  Lean beef  Tuna  Turkey breast  Fish  Shrimp    Vegan/plant-based examples  Buckwheat  Hummus and mario  Soy products (tofu, tempeh, edamame beans)  Peanut butter on toast or  some other bread  Beans and rice  Quinoa  Hemp and mitzi seeds  Spirulina  Generally, meat, poultry, fish, eggs, and dairy products are complete protein sources. Nuts and seeds, legumes, grains, and vegetables, among other things, are usually incomplete proteins. There is nothing wrong with incomplete proteins however, and there are many healthy, high protein foods that are incomplete proteins. As long as you consume a sufficient variety of incomplete proteins to get all the required amino acids, it is not necessary to specifically eat complete protein foods. In fact, certain high fat red meats for example, a common source of complete proteins, can be unhealthy.   Below are some examples of high protein foods that are not complete proteins:  Almonds  Oats   Broccoli  Lentils  Pedro Luis bread  Mitzi seeds  Pumpkin seeds  Peanuts  [place] sprouts  Grapefruit  Green peas  Avocados  Mushrooms  As can be seen, there are many different foods a person can consume to meet their RDA of protein. The examples provided above do not constitute an exhaustive list of high protein or complete protein foods. As with everything else, balance is important, and the examples provided above are an attempt at providing a list of healthier protein options (when consumed in moderation).    Amount of protein in common food      Protein Amount  Milk (1 cup/8 oz)  8 g  Egg (1 large/50 g)  6 g  Meat (1 slice / 2 oz)  14 g  Seafood (2 oz)  16 g  Bread (1 slice/64 g)   8 g  Corn (1 cup/166 g)  16 g  Rice (1 cup/195 g)  5 g  Dry Bean (1 cup/92 g)   16 g  Nuts (1 cup/92 g)    20 g  Fruits and Veggie (1 cup)  1 g    Data above taken from www.calculator.net    The Power of Protein:    High Protein Foods:  FISH  (3-6 ounces/meal)  All types of fish  Seafood (shrimp, scallops, clams, mussels, lobster)  EGGS     2-3 eggs/meal  DAIRY (2/3 to 1 ½ cup)  Cottage cheese   Greek yogurt  PLANTS (½-3/4 cup/meal)  Legumes: Dried beans and peas (black beans, chavez  beans, garbanzo beans, kidney, cannellini, navy, split peas, black eyed peas)  Lentils  Quinoa  Soy (edamame, tofu)  PORK   (3-6 oz/meal)  Tenderloin  Pork chop  Top loin roast, boneless  Sirloin roast, boneless  Pima sheffield (nitrate free)  Boiled deli ham (nitrate free)    Additional Protein Sources:  BEEF    (3-6 oz/meal)  Flank steak       Skirt steak  Bottom round(rump roast), select    Ground beef, 90% lean (ground sirloin)  Bola eye steak, choice  Eye of round roast, choice   POULTRY  (3-6 oz/meal)  Ground chicken or turkey  Chicken, no skin  Turkey, no skin  PROTEIN SHAKES: OWYN, Koia, and Rebbl (plant based and dairy free), Corepower by ApplyKitLois (plant based option available), Premier Protein, JuicePlus Complete (www.juiceplus.com)  PROTEIN BARS: RXBAR, PowerCrunch, Quest, Barebells, Mosh  SNACK/ON-the-GO OPTIONS: Chomps Meatstick, Oats Overnight (www.oatsovernight.Mobile Theory), [place] brand protein granola      Eating Out vs. Eating at Home    by Chef Triston Austin and Franci Chirinos, MS, RD, LD    OAC at www.obesityaction.org Fall 2010 Resource    I haven’t met a person who doesn’t like going out to a restaurant to eat on occasion. If the atmosphere is just right, the food is tasty and the service is great, the meal is considered perfect. But, is it?    The very first restaurant in the world opened in [place] in 1765. A , Bienvenido Bain, served a single dish: sheep’s feet simmered in a white sauce. As for the [place] the [facility] is the oldest restaurant in [place] as well as the oldest restaurant in continuous service. Since 1826, their doors have always been open to diners.    I have had the pleasure of eating at the [employer]. The food was just ok, the service average, but the atmosphere was amazing. In a nutshell, it’s not always the food that makes the restaurant, but the atmosphere or nostalgia.    Restaurants are often looked at as a convenience -- a place to relax and  have a good meal. However, I challenge this theory. Think about this: can you go to a restaurant and eat in your underwear and favorite pair of woolly socks? A little ridiculous, but the point is that you’re most comfortable in your own home. In addition, eating at home is more convenient, costs less and above all, it can be a lot healthier.    Serving Sizes  As Americans, we have become accustomed to and expect larger portion sizes from restaurants. “I want my money’s worth,” and “We love coming here because the portion sizes are huge,” are the most common statements I hear when going to a restaurant. Most restaurants serve two to three times more than the healthy portion sizes recommended by the [place] Dietary Guidelines.    Not only is this not healthy, but most people don’t know what a proper portion size is. They tend to overeat and maybe “eat the whole thing.” We have become accustomed to expecting a filled to-go box to take home. You will notice the “made at home” portion sizes in the chart above are smaller and are the recommended serving size. Remember, proper serving sizes mean less calories consumed.    Savor the Flavor  Restaurants are in business to make money, and calorie-counting is not at the top of their list. Large chain restaurants have corporate  whose sole responsibility is to create mouth-watering, can’t-put-down food. Calories, fat, carbohydrates and many other nutrient values that are recommended are typically lost in the sea of making the tastiest dish with little regard for nutrition.    Two fried chicken patties used as a bun with cheese and do stuffed in the middle is being sold in a major chicken chain. Another chain sells an awesome Asian salad as far as taste goes, but with its toppings and salad dressing, it has more than 800 calories.    At a restaurant, you have almost no control over how most items are prepared, leaving your health and wellness in the hands of the  in the  back. At home, you control how much salt is being used, what fat you use to cook with, the quality of the food product and most of all, you’re in control of your health and wellness.    Time Saving  “Eating at a restaurant saves me time,” is far from the truth. The average person does not want to spend more than 20 minutes to prepare a meal for their family. Choose a recipe or food item that requires the amount of time you have to spend in the kitchen.    Plan ahead for days when you have kids’ soccer practice and you know a meal needs to be quick and nutritious (such as chicken Caesar salad). Then, on the days where life gives you more time, plan a pot roast with veggies where the prep time is 15 minutes and the cook time is three hours. As for the restaurant being quicker… if getting in the car and driving to the restaurant, waiting to be seated, waiting to order your food, waiting to get your food, paying for your meal and then driving home is quicker, then you might want to try a different recipe.    Take Responsibility  When all is said and done, you must take responsibility for your own health and wellness. Restaurants provide a great service, but in the end, you need to make decisions based on where you are in your weight management goals.    Why We Gain Weight When We’re Stressed--And How Not To  The psychology and biology of stress-related overeating and weight gain   Emotional Eating under Stress  Have you ever found yourself mindlessly eating a tub of ice cream while you brood about your latest romantic rejection or eating a hamburger and fries in front of your computer as you furiously try to make a work deadline? Perhaps you’re a busy mom, eating cookies in your car as you shuttle the kids back and forth to a slew of activities. Or you’re a small business owner desperately trying to make ends meet when you suddenly realize your waistline has expanded. If you recognize yourself in any of these scenarios,  you’re not alone and it’s probably not your fault. Stress that goes on for a long period is a triple whammy for weight--it increases our appetites, makes us hold onto the fat, and interferes with our willpower to implement a healthy lifestyle.  Below are the four major reasons stress leads to weight gain and four great research-based coping strategies you can use to fight back.  Hormones  When your brain detects the presence of a threat, no matter if it is a snake in the grass, a grumpy boss, or a big credit card bill, it triggers the release of a cascade of chemicals, including adrenaline, CRH, and cortisol. Your brain and body prepare to handle the threat by making you feel alert, ready for action and able to withstand an injury. In the short-term, adrenaline helps you feel less hungry as your blood flows away from the internal organs and to your large muscles to prepare for “fight or flight.” However, once the effects of adrenaline wear off, cortisol, known as the “stress hormone,” hangs around and starts signaling the body to replenish your food supply. Fighting off wild animals, like our ancestors did, used up a lot of energy, so their bodies needed more stores of fat and glucose. Today’s human, who sits on the couch worrying about how to pay the bill or works long hours at the computer to make the deadline, does not work off much energy at all dealing with the stressor! Unfortunately, we are stuck with a neuroendocrine system that didn’t get the update, so your brain is still going to tell you to reach for that plate of cookies anyway.  Belly Fat  In the days when our ancestors were fighting off tigers and famine, their bodies adapted by learning to store fat supplies for the long haul. The unfortunate result for you and me is that when we are chronically stressed by life crises and work-life demands, we are prone to getting an extra layer of “visceral fat” deep in our bellies. Your belly has an ample supply  of blood vessels and cortisol receptors to make the whole process flow more efficiently. The downside is that excess belly fat is unhealthy and difficult to get rid of. The fat releases chemicals triggering inflammation, which increases the likelihood that we will develop heart disease or diabetes. And it can make it more difficult to fit into those jose jeans you splurged on, leading to more stress about money wasted! Unfortunately, excess cortisol also slows down your metabolism, because your body wants to maintain an adequate supply of glucose for all that hard mental and physical work dealing with the threat.  Anxiety  When we have a surge of adrenaline as part of our fight/flight response, we get fidgety and activated. Adrenaline is the reason for the “wired up” feeling we get when we’re stressed. While we may burn off some extra calories fidgeting or running around cleaning because we can’t sit still, anxiety can also trigger “emotional eating.” Overeating or eating unhealthy foods in response to stress or as a way to calm down is a very common response. In the most recent [employer]’s “Stress in [place]:” survey, a whopping 40% of respondents reported dealing with stress in this way, while 42% reported watching television for more than 2 hours a day to deal with stress. Being a couch potato also increases the temptation to overeat and is inactive, which means that those extra calories aren’t getting burned off. Anxiety can also make you eat more “mindlessly” as you churn around worrying thoughts in your head, not even focusing on the taste of the food, how much you’ve eaten, or when you are feeling full. When you eat mindlessly, you will likely eat more, yet feel less satisfied.  Cravings and Fast Food  When we are chronically stressed, we crave “comfort foods,” such as a bag of potato chips or a tub of ice cream. These foods tend to be easy to eat, highly processed, and high in fat,  sugar, or salt. We crave these foods for both biological and psychological reasons. Stress may mess up our brain’s reward system or cortisol may cause us to crave more fat and sugar. We also may have memories from childhood, such as the smell of freshly baked cookies,, that lead us to associate sweet foods with comfort. When we are stressed, we also may be more likely to drive through the Fast Food place, rather than taking the time and mental energy to plan and cook a meal. Americans are less likely to cook and eat dinner at home than people from many other countries, and they also work more hours. Working in urban areas may mean long, jammed commutes, which both increase stress and interfere with willpower because we are hungrier when we get home later. A [facility] research study showed, in laboratory mice, that being “stressed” by exposure to the smell of a predator lead the mice to eat more high-fat food pellets, when given the choice of eating these instead of normal feed.  Less Sleep  Do you ever lie awake at night worrying about paying the bills or about who will watch your kids when you have to go to work? According to the APA’s “Stress in [place]” survey, more than 40% of us lie awake at night as a result of stress. Research shows that worry is a major cause of insomnia. Our minds are overactive and won’t switch off. We may also lose sleep because of pulling overnights to cram for exams or writing until the early hours. Stress causes decreased blood sugar, which leads to fatigue. If you drink coffee or caffeinated soft drinks to stay awake, or alcohol to feel better, your sleep cycle will be even more disrupted. Sleep is also a powerful factor influencing weight gain or loss. Lack of sleep may disrupt the functioning of ghrelin and leptin--chemicals that control appetite. We also crave carbs when we are tired or grumpy from lack of sleep. Finally, not getting our jennifer zzzz’s erodes our  willpower and ability to resist temptation. In one study, overweight/obese dieters were asked to follow a fixed calorie diet and assigned to get either 5 and a half or eight and a half hours of sleep a night (in a sleep lab). Those with sleep deprivation lost substantially less weight.  How to Minimize Weight Gain When You’re Stressed  Exercise  Aerobic exercise has a one-two punch. It can decrease cortisol and trigger release of chemicals that relieve pain and improve mood. It can also help speed your metabolism so you burn off the extra indulgences  Eat Mindfully  Mindful Eating programs train you in meditation, which helps you cope with stress, and change your consciousness around eating. You learn to slow down and tune in to your sensory experience of the food, including its sight, texture or smell. You also learn to tune into your subjective feelings of hunger or fullness, rather than eating just because it’s a mealtime or because there is food in front of you. A well-designed study of binge-eaters showed that participating in a Mindful Eating program led to fewer binges and reduced depression.  Find Rewarding Activities Unrelated to Food  Taking a hike, reading a book, going to a yoga class, getting a massage, patting your dog, or making time for friends and family can help to relieve stress without adding on the pounds. Although you may feel that you don’t have time for leisure activities with looming deadlines, taking time to relieve stress helps you to feel refreshed, lets you think more clearly, and improves your mood, so you are less likely to overeat.  Write in a Journal  Writing down your experiences and reactions or your most important goals keeps your hands busy and your mind occupied, so you’re less likely to snack on unhealthy foods. Writing can give you insight into why you’re feeling so stressed and highlight ways of thinking or expectations of yourself that may be increasing the pressure you  feel. Writing down your healthy eating and exercise goals may make you more conscious of your desire to live a healthier lifestyle and intensify your commitment. Research studies have also shown that writing expressively or about life goals can improve both mood and health.    Source: Psychology Today  Date: Posted Aug 28, 2013   Author: Zayra Trimble, Ph.D

## 2025-04-02 NOTE — PROGRESS NOTES
HISTORY OF PRESENT ILLNESS  Chief Complaint   Patient presents with    Weight Problem     Pt friend referred pt to our wt lose clinic.pt is interested in trying wt lose medication.       Natalie Storm is a 64 year old female new to our office today for initiation of medical weight loss program, referred by friend.  Patient presents today with c/o excess weight over the past year.    Reason/goal for weight loss: get under 155# and maintain.    Previous weight loss efforts in the past: WW, exercise    Past 6 months lifestyle interventions: yes, regular exercise    Reviewed [facility] patient contract. Readiness for Lifestyle change: 10/10, Interest in Medication: 10/10, Bariatric surgery interest: 0/10.    Barriers to weight loss: meal choices    Wt Readings from Last 6 Encounters:   04/02/25 164 lb (74.4 kg)   03/21/24 160 lb 9.6 oz (72.8 kg)   02/09/22 148 lb (67.1 kg)   01/07/19 153 lb (69.4 kg)   01/15/18 156 lb (70.8 kg)   08/29/17 158 lb 3.2 oz (71.8 kg)          Social hx and lifestyle reviewed:    How many meals do you eat out per week: 10  Who is the primary cook in your home: patient    Please respond to the questions regarding your previous weight loss    How did you hear about the [facility]?    Previous weight loss efforts in the past/medication(s): I am a Weight Watchers Lifetime member.  I am not using the program, and I have not weighed in for several months because I am well over my goal wt.  I have also used [employer] in the past with some success, but stopped when they closed their centers.   Eating behaviors/patterns that have been barriers to weight loss success in the past: My life is SO busy right now, so identifying the right eating program for me that is going to allow me to get to/remain at a healthy weight is a challenge, and I feel like I need some help with that.   Please respond to the questions regarding a 24 hour food journal.  Include the average time you ate and the  quantity/food preparation method.    List foods, qty and prep for breakfast: 1 piece raisin cinnamon toast with 1/2 t. butter, 1/2 banana.   List foods, qty and prep for lunch. 1 falafel amrio sandwich.   List foods, qty and prep for dinner. 1 serving salad bar from [employer] with about 2 T chipotle ranch dressing.  1 small cup swirl soft serve ice cream.   List foods, qty and prep for snacks. 1 small piece whole wheat toast with 1/2 avocado, arugula, and olive oil.  Approximately 20 Pringles Original chips.  2 pieces Dove Promises milk chocolate.  1/2 cup cinnamon applesauce.   List the types and qty of fluids consumed Water throughout the day.  1 cup coffee with 2.5 oz vanilla soy milk and 2 pumps Starbucks vanilla syrup.  1 can [place] Cherry Limeade sparkling water.   Please respond to the questions regarding lifestyle.    Tobacco use: No   Alcohol use: How many servings per week? 2   Supplements taken on a regular basis include: Lemme Debloat Daily Digestive Gummy (1), Younique Daily You hair, skin, and nails gummies (2)   Please respond to the questions regarding exercise/activity    How many days per week are you active or exercise 5   What type of activities: Walking and weight lifting.   Perceived level of exertion on a scale of 1-5, with 5 being very intense: 4   Average stress level on a scale of 1-10, with 10 being extremely stressed: 8   How do you cope with stress: Organize myself, spend more time working, plan social activities, connect with friends.   Please respond to the questions regarding sleep    How many hours of uninterrupted sleep do you get a night: 7   How many times do you wake up in the night: 1   Do you feel rested in the morning: Yes   Do you snore: No   Do you have sleep apnea: No   Do you use: None     Work:  with  and self- employed as /Dog walking  Marital status: Single with roommate    MEDICAL HISTORY  PMH reviewed:   Cardiac disorders:  HTN  Depression/anxiety: negative  Glaucoma: negative  Kidney stones: negative  Eating disorder: negative  Migraines: negative  Seizures: negative  Joint-related conditions: negative  Liver disease: negative  Renal disease: negative  Diabetes: negative  Thyroid disease: negative  Constipation: negative  Other pertinent hx: n/a  Sleep Apnea hx: negative  Cancer hx: negative  Cholecystectomy and/or gallstones: negative  Family or personal history of Pancreatic issues / Medullary Thyroid Cancer/MENS 2: negative  History of bariatric surgery: negative    FMH reviewed    REVIEW OF SYSTEMS  GENERAL: feels well otherwise  SKIN: denies any rashes to skin folds  HEENT: snoring- no  LUNGS: denies shortness of breath with exertion, no apnea  CARDIOVASCULAR: denies chest pain on exertion, denies palpitations or pedal edema  GI: denies abdominal pain.  No N/V/D/C  MUSCULOSKELETAL: denies joint pains  NEURO: denies headaches  PSYCH: denies change in behavior or mood, denies feeling sad or depressed. BED screen- no    EXAM    /80   Pulse 60   Resp 16   Ht 5' 5.5\" (1.664 m)   Wt 164 lb (74.4 kg)   SpO2 99%   BMI 26.88 kg/m² ,   BC not available. WC: 33 inches.  GENERAL: well developed, well nourished, in no apparent distress  SKIN: warm, pink, dry without rashes to exposed area  EYES: conjunctiva pink, sclera non icteric, PERRLA  HEENT: atraumatic, normocephalic, O/p: Mallampati score- 2  NECK: supple, non tender, no adenopathy, no thyromegaly  LUNGS: CTA in all fields, breathing non labored  CARDIO: RRR without murmur, normal S1 and S2 without clicks or gallops, no pedal edema. Reviewed EKG in EMR dated 2/2/2021.   GI: +BS, soft, no masses, HSM or tenderness  MUSCULOSKELETAL: grossly intact  NEURO: Oriented times three  PSYCH: pleasant, cooperative, normal mood and affect    Lab Results   Component Value Date    WBC 4.6 04/16/2024    RBC 4.31 04/16/2024    HGB 13.1 04/16/2024    HCT 39.8 04/16/2024    MCV 92.3  04/16/2024    MCH 30.4 04/16/2024    MCHC 32.9 04/16/2024    RDW 12.5 04/16/2024    .0 04/16/2024     Lab Results   Component Value Date    GLU 94 04/16/2024    BUN 19 04/16/2024    BUNCREA 15.0 01/07/2019    CREATSERUM 0.75 04/16/2024    ANIONGAP 9 04/16/2024    GFR 77 05/12/2016    GFRNAA 83 01/07/2019    GFRAA 95 01/07/2019    CA 10.0 04/16/2024    OSMOCALC 296 (H) 04/16/2024    ALKPHO 77 04/16/2024    AST 10 (L) 04/16/2024    ALT 19 04/16/2024    BILT 0.6 04/16/2024    TP 6.8 04/16/2024    ALB 3.6 04/16/2024    GLOBULIN 3.2 04/16/2024     04/16/2024    K 4.1 04/16/2024     04/16/2024    CO2 24.0 04/16/2024     No results found for: \"EAG\", \"A1C\"  Lab Results   Component Value Date    CHOLEST 179 04/16/2024    TRIG 91 04/16/2024    HDL 57 04/16/2024     (H) 04/16/2024    VLDL 15 04/16/2024    TCHDLRATIO 2.61 05/12/2016    NONHDLC 122 04/16/2024     Lab Results   Component Value Date    TSH 2.150 04/16/2024     No results found for: \"B12\", \"VITB12\"  Lab Results   Component Value Date    VITD 28.5 (L) 05/12/2016       Medications Ordered Prior to Encounter[1]    ASSESSMENT  Initial Weight Data and Goal Weight Loss:  Weight Calculations  Initial Weight: 164 lbs  Initial Weight Date: 04/02/25  Today's Weight: 164 lbs  5% Goal: 8.2  10% Goal: 16.4  Total Weight Loss: 0 lbs    Diagnoses and all orders for this visit:    Encounter for therapeutic drug monitoring  -     Vitamin D; Future  -     Vitamin B12; Future  -     Hemoglobin A1C; Future  -     Phentermine HCl 15 MG Oral Cap; Take 1 capsule (15 mg total) by mouth every morning.    Overweight (BMI 25.0-29.9)  - Start Phentermine as directed  - Reviewed balanced plate nutrition with focus on whole food, regular meals daily that include protein and produce and eliminating/reducing late night eating.  - Counseled on the 4 Pillars of health (sleep, stress, nutrition and fitness).  - Reviewed weight synopsis in EMR  Comments:  Baseline BMI: 27.26  (5/4/2017)  Orders:  -     OP REFERRAL TO DIETITIAN [facility] ([facility] USE ONLY)  -     Vitamin D; Future  -     Vitamin B12; Future  -     Hemoglobin A1C; Future  -     Phentermine HCl 15 MG Oral Cap; Take 1 capsule (15 mg total) by mouth every morning.    Essential hypertension  -     OP REFERRAL TO DIETITIAN [facility] ([facility] USE ONLY)  -     Vitamin D; Future  -     Vitamin B12; Future  -     Hemoglobin A1C; Future    Weight gain  -     OP REFERRAL TO DIETITIAN [facility] ([facility] USE ONLY)  -     Vitamin D; Future  -     Vitamin B12; Future  -     Hemoglobin A1C; Future  -     Phentermine HCl 15 MG Oral Cap; Take 1 capsule (15 mg total) by mouth every morning.    Menopause  -     OP REFERRAL TO DIETITIAN [facility] ([facility] USE ONLY)  -     Vitamin D; Future  -     Vitamin B12; Future  -     Hemoglobin A1C; Future    Stress  -     OP REFERRAL TO DIETITIAN [facility] ([facility] USE ONLY)  -     Vitamin D; Future  -     Vitamin B12; Future  -     Hemoglobin A1C; Future        PLAN  Medication use and side effects reviewed with patient.  Medication contraindications: none  Follow up with dietitian and psychologist as recommended.  Discussed the role of sleep and stress in weight management.  Labs orders as above.  Counseled on comprehensive weight loss plan including attention to nutrition, exercise and behavior/stress management for success. See patient instruction below for more details.  Reviewed previous labs in EMR/Care Everywhere  Weight Loss Contract reviewed and signed.    Patient Instructions   Welcome to the [facility]...your Lifestyle Renovation begins now!  Thank you for placing your trust in our health care team, I look forward to working with you along this journey to better health!    Next steps:     1.  Call our office at [phone] to schedule a personal nutrition consultation with one of our registered dieticians, Solomon or Yulisa. Bring along your food journal (3 days minimum). See journal options  below.  2.  Complete non fasting labs at [facility] lab site prior to next office visit. Lab results will be communicated via SincroPool.  3.  Fill your prescribed medication and take as discussed and prescribed: Start Phentermine daily.      Please try to work on the following dietary changes this first month:    1.  Drink water with meals and throughout the day, cut down on soda and/or juice if consumed. Consider flavored water options like Bubbly, Spindrift, Hint and Gunnar. Reduce alcohol servings to 4 per week maximum.  2.  Have protein with each meal, examples include: greek yogurt, cottage cheese, hard boiled egg, tofu, chicken, fish, or tuna.   3.  Work towards reducing/eliminating refined carbohydrates and sugars which includes items such as sweets, as well as rice, pasta, and bread and make sure to choose whole grain options when having them with just 1 serving per meal about the size of your inner palm.  4.  Consume non starchy veggies daily working towards making them a good 50% of your daily food intake. Add them to lunch and dinner consistently.  5.  Start a daily probiotic: VSL#3 is recommended, (order on line at www.vsl3.com). Take 1 capsule daily with water for 30 days, then reduce to 1 every other day (this will reduce the cost). Capsules can be left out of refrigerator for 2 weeks. I recommend using a pill box weekly and keeping the bottle in the fridge.    Please download duane My Fitness Pal, LoseIt! Or My Net Diary to monitor daily dietary intake and you will be able to see if you are eating the right amount of calories or too much or too little which would hinder weight loss. Additionally this will help to see your daily carbohydrate and protein intake. When you set the duane up choose 1.5 lbs/week as a goal.  Keeping a paper food journal is an option as well to remain accountable for your choices- this is the start to mindful eating! A low calorie diet has been consistently shown to support  weight loss.    Continue or start exercising to help establish a routine. If not already exercising begin with 1 day/week and progress as able with the goal of working towards 30 minutes 5 days a week at a minimum. A variety or cardio, strength and stretching is important. Review resources below to help support you in building this healthy routine.    Meditation daily can help manage and control stress. Chronic stress can make weight loss difficult.  Exercising is one way to help with stress, but meditation using the CALM Yaquelin or another comparable alternative can be done in your home or place of work with little time commitment. This Yaquelin can also help work on behavior change and improve sleep. Check out the segment under Calm Masterclass and listen to The 4 Pillars of Health. A great way to begin learning about the foundation of lifestyle with practical tips to use in your every day. In addition, we offer counseling services and support for individual connection and care. A referral is necessary so please let me know if this is a service you are interested.    Check out www.yourYu Rongtters.org blog for continued support and education along your weight loss journey to optimal health!      Patient Resources:    Personal Training/Fitness Classes/Health Coaching    [facility] in [place]: Full fitness center with group fitness and personal training located in [place].  Health Coaching with Liliana Amin, Efraín Cantu, and Saw Todd at our [facility]- individual coaching to work on your health goals. Call [phone] and/or email @ [email or web address]. Free 60 minute consult when client of [facility].  NOLAN Bower @ [email or web address].Nor-Lea General HospitalcucaAgendaHolzer Medical Center – Jackson.[employer]. A variety of group fitness options plus various yoga classes [phone] and/or email Abeba at [email or web address]  [facility] Hospital[facility] Fitness Centers with multiple locations: Fidelithon Systems  (www.Storitz.Sabakat), F45 Training (www.c16izghanwr.Sabakat), Fit Body Bootcamp (www.fitbodybootcamp.Sabakat), OGSystems (www.Amulet Pharmaceuticals.Sabakat), The Exercise  (www.exercisecoach.com), Club Pilates (www.clubMediaPass.Sabakat)    Online Fitness  Fitness  on Utube  Fit in 10 DVD series   www.knsio89ZVIAbingdon Health  Chair exercises via Sit and Be Fit (www.sitandbefit.org) and citiservi (www.Biomimedica.com) or Martin Suazo or Anderson Simon videos on YouTube.  Hip Hop Fit with [employer] at www.hiphopfit.[employer]    Apps for on the Go Fitness  [place] 7 Minute Workout (orange box with white 7) - free on the go HIIT training yaquelin  Peloton Yaquelin @ www.onepeloton.com    Nutrition Trackers, Meal Preparation, and Other Meal Programs  LoseIT! And My Fitness Pal apps and on line for tracking nutrition  NOOM - virtual health coaching  FitFoundation (healthy meals on the go) in [place] @ www.plqxgctxujoiy6fAbingdon Health  Bistro MD @ www.bistromd.Sabakat and Oiggsm43 (calorie smart and low carb plans recommended) @ www.nzvsaz49.com, Metabolic Meals @ www.MyMetabolicMeals.com - individual prepared meals to go  Gobble, Blue Apron, Home , Every Plate, Sunbasket- on line meal delivery programs for preparation at home  Meal [facility] in [place] for homemade meals to go @ www.mealvillage.com  Diet Doctor @ www.dietdoctor.com - low carb swaps  ReciMe and Mealime yaquelin (grocery and meal planning)    Stress, Anxiety, Depression, Trauma  CALM meditation yaquelin (www.calm.com)  Headspace  Don't let anxiety run your life. Using the science of emotion regulation and mindfulness to overcome fear and worry by Jordy iLon PsyD and Neftali Velazco MA.  The Perfecto Mobile Podcast (September 27, 2023): 6 Magic Words That Stop Anxiety  What Happened to You?- a look at the impact trauma has on behavior written by Ernesto Gaston and Dr. Sorin Barros  Whole Again by Art Sepulveda - discovering your true self after trauma    Mindful Eating/The Hungry  Brain  Am I Hungry? Mindful eating virtual  duane (www.amihungry.com)  The Hungry Brain by Yashira Farfan, PhD  Mindless Eating by Dmitry Lloyd  Weight Loss Surgery Will Not Treat Food Addiction by Dian Young Ph.D    Metabolic Dysfunction, Hormones and Cravings  Why We Get Sick? By Raul Bone (insulin resistance)  Your Body in Balance: The New Science of Food, Hormones, and Health by Dr. Parker Brandt  The Complete Guide to fasting by Dr. Almaraz  Fast Like a Girl by Dr. Luna Holloway  The M Factor (documentary on PBS about Menopause)  Sugar, Salt & Fat by Bettie Shannon, Ph.D, R.D.  The Truth About Sugar - documentary on sugar (Free on ActSocial, https://youKewl Innovationsu.be/8M3txaeNQ6q)  Presentation on SUGAR called Sugar: The Bitter Truth by Dr. Abhilash Marley (ActSocial) https://youKewl Innovationsu.be/dBnniua6-oM?si=tozhb0rap5zc3miy  Reverse Visceral Fat: #1 Way to Increase Your Lifespan & End Inflammation with Dr. Bryan Dunlap on Utube @ https://youKewl Innovationsu.be/nupPRnvUpJY?si=xk1yziDaUTG6DchX    Nutrition Support  You Are What You Eat - Netfix series on twin study looking at impact of nutrition changes on health  The End of Dieting: How to Live for Life by Dr. Layton Fuchs M.D. or listen to The WorldDesk Podcast Episode 63: Understanding \"Nutritarian\" Eating w/Dr. Layton Fuchs  The Game Changers- Netflix Documentary on plant based nutrition  The Dr. Collado T5 Wellness Plan by Dr. Dani Collado MD  The Complete Guide to fasting by Dr. Almaraz  @[facility] ([facility] Dietician with support surrounding nutrition and meal prep/planning)    Education, Motivation and Support Resources  Live to 100: Secrets of the Blue Zones - Netflix series on the secrets to communities living over 100 years old  Atomic Habits by Manny Woodard (a book about taking small steps to promote greater behavior change)   Motivation duane (black box with white \")- daily supportive messages sent to your phone  Can't Hurt Me by Jordy Gardiner (a book exploring the power of  discipline in achieving your goals)  Fed Up - documentary about obesity (Free on Utube)  Www.yourweightmatters.org - Obesity Action Coalition sponsored Blog posts  Obesity Action Coalition Resources on topics specific to weight management (www.obesityaction.org)  Fitlosophy Fitspiration - journal to better health (journal book found at [employer] in fitness aisle)  Matthew Carranza talk titled: The Call to Courage (Netflix)  The Exam Room by the Physician's Committee (Podcast)  Nutrition Facts by Dr. Victoria (Podcast)      Balanced Nutrition includes:     Build the mentality of Food 4 Fuel. Clean eating with whole foods and eliminating/reducing ultra processed foods.  Be an intuitive eater and using mindful eating practices.  Eat a balanced plate with protein and produce at all meals: 1/4 plate- protein, 1/2 plate non starchy veggies, and 1/4 plate fruit or complex carbohydrate.  Drink water with all meals and use a salad plate to naturally reduce portions.  Eliminate/reduce late night eating by stopping after 7pm. Allowing your body to fast for 12 hours (drink only water, tea or black coffee without any additives).              What are proteins?  Proteins are one of three primary macronutrients that provide energy to the human body, along with fats and carbohydrates. Proteins are also responsible for a large portion of the work that is done in cells; they are necessary for proper structure and function of tissues and organs, and also act to regulate them. They are comprised of a number of amino acids that are essential to proper body function, and serve as the building blocks of body tissue.  There are 20 different amino acids in total, and the sequence of amino acids determines a protein's structure and function. While some amino acids can be synthesized in the body, there are 9 amino acids that humans can only obtain from dietary sources (insufficient amounts of which may sometimes result in death), termed essential amino  acids. Foods that provide all of the essential amino acids are called complete protein sources, and include both animal (meat, dairy, eggs, fish) as well as plant-based sources (soy, quinoa, buckwheat).    Proteins can be categorized based on the function they provide to the body. Below is a list of some types of proteins:  Antibody--proteins that protect the body from foreign particles, such as viruses and bacteria, by binding to them  Enzyme--proteins that help form new molecules as well as perform the many chemical reactions that occur throughout the body  Messenger--proteins that transmit signals throughout the body to maintain body processes  Structural component--proteins that act as building blocks for cells that ultimately allow the body to move  Transport/storage--proteins that move molecules throughout the body  As can be seen, proteins have many important roles throughout the body, and as such, it is important to provide sufficient nutrition to the body to maintain healthy protein levels.    How much protein do I need?  The amount of protein that the human body requires daily is dependent on many conditions, including overall energy intake, growth of the individual, and physical activity level. It is often estimated based on body weight, as a percentage of total caloric intake (10-35%), or based on age alone. 0.8g/kg of body weight is a commonly cited recommended dietary allowance (RDA). This value is the minimum recommended value to maintain basic nutritional requirements, but consuming more protein, up to a certain point, maybe beneficial, depending on the sources of the protein.  The recommended range of protein intake is between 0.8 g/kg and 1.8 g/kg of body weight, dependent on the many factors listed above. People who are highly active, or who wish to build more muscle should generally consume more protein. Some sources suggest consuming between 1.8 to 2 g/kg for those who are highly active. The  amount of protein a person should consume, to date, is not an exact science, and each individual should consult a specialist, be it a dietitian, doctor, or , to help determine their individual needs. I recommend your daily protein intake to be 90 grams/day.    Foods high in protein  There are many different combinations of food that a person can eat to meet their protein intake requirements. For many people, a large portion of protein intake comes from meat and dairy, though it is possible to get enough protein while meeting certain dietary restrictions you might have. Generally, it is easier to meet your RDA of protein by consuming meat and dairy, but an excess of either can have a negative health impact. There are plenty of plant-based protein options, but they generally contain less protein in a given serving. Ideally, a person should consume a mixture of meat, dairy, and plant-based foods in order to meet their RDA and have a balanced diet replete with nutrients.    If possible, consuming a variety of complete proteins is recommended. A complete protein is a protein that contains a good amount of each of the nine essential amino acids required in the human diet. Examples of complete protein foods or meals include:    Meat/Dairy examples  Eggs  Chicken breast  Cottage cheese  Greek yogurt  Milk  Lean beef  Tuna  Turkey breast  Fish  Shrimp    Vegan/plant-based examples  Buckwheat  Hummus and mario  Soy products (tofu, tempeh, edamame beans)  Peanut butter on toast or some other bread  Beans and rice  Quinoa  Hemp and mitzi seeds  Spirulina  Generally, meat, poultry, fish, eggs, and dairy products are complete protein sources. Nuts and seeds, legumes, grains, and vegetables, among other things, are usually incomplete proteins. There is nothing wrong with incomplete proteins however, and there are many healthy, high protein foods that are incomplete proteins. As long as you consume a sufficient  variety of incomplete proteins to get all the required amino acids, it is not necessary to specifically eat complete protein foods. In fact, certain high fat red meats for example, a common source of complete proteins, can be unhealthy.   Below are some examples of high protein foods that are not complete proteins:  Almonds  Oats   Broccoli  Lentils  Pedro Luis bread  Kamlesh seeds  Pumpkin seeds  Peanuts  [place] sprouts  Grapefruit  Green peas  Avocados  Mushrooms  As can be seen, there are many different foods a person can consume to meet their RDA of protein. The examples provided above do not constitute an exhaustive list of high protein or complete protein foods. As with everything else, balance is important, and the examples provided above are an attempt at providing a list of healthier protein options (when consumed in moderation).    Amount of protein in common food      Protein Amount  Milk (1 cup/8 oz)  8 g  Egg (1 large/50 g)  6 g  Meat (1 slice / 2 oz)  14 g  Seafood (2 oz)  16 g  Bread (1 slice/64 g)   8 g  Corn (1 cup/166 g)  16 g  Rice (1 cup/195 g)  5 g  Dry Bean (1 cup/92 g)   16 g  Nuts (1 cup/92 g)    20 g  Fruits and Veggie (1 cup)  1 g    Data above taken from www.calculator.net    The Power of Protein:    High Protein Foods:  FISH  (3-6 ounces/meal)  All types of fish  Seafood (shrimp, scallops, clams, mussels, lobster)  EGGS     2-3 eggs/meal  DAIRY (2/3 to 1 ½ cup)  Cottage cheese   Greek yogurt  PLANTS (½-3/4 cup/meal)  Legumes: Dried beans and peas (black beans, chavez beans, garbanzo beans, kidney, cannellini, navy, split peas, black eyed peas)  Lentils  Quinoa  Soy (edamame, tofu)  PORK   (3-6 oz/meal)  Tenderloin  Pork chop  Top loin roast, boneless  Sirloin roast, boneless  [place] sheffield (nitrate free)  Boiled deli ham (nitrate free)    Additional Protein Sources:  BEEF    (3-6 oz/meal)  Flank steak       Skirt steak  Bottom round(rump roast), select    Ground beef, 90% lean (ground  sirloin)  Bola eye steak, choice  Eye of round roast, choice   POULTRY  (3-6 oz/meal)  Ground chicken or turkey  Chicken, no skin  Turkey, no skin  PROTEIN SHAKES: MARIELLA, Janine, and Rebbl (plant based and dairy free), Corepower by Fairlife, Orgain (plant based option available), Premier Protein, JuicePlus Complete (www.juiceplus.com)  PROTEIN BARS: RXBAR, PowerCrunch, Quest, Barebells, Mosh  SNACK/ON-the-GO OPTIONS: Chomps Meatstick, Oats Overnight (www.oatsovernight.Stockleap), Kula brand protein granola      Eating Out vs. Eating at Home    by Chef Triston Austin and Franci Chirinos, MS, RD, LD    OAC at www.obesityaction.org Fall 2010 Resource    I haven’t met a person who doesn’t like going out to a restaurant to eat on occasion. If the atmosphere is just right, the food is tasty and the service is great, the meal is considered perfect. But, is it?    The very first restaurant in the world opened in [place] in 1765. A , Rashidaellakaylin Odell, served a single dish: sheep’s feet simmered in a white sauce. As for the [place] the [employer] is the oldest restaurant in [place] as well as the oldest restaurant in continuous service. Since 1826, their doors have always been open to diners.    I have had the pleasure of eating at the [employer]. The food was just ok, the service average, but the atmosphere was amazing. In a nutshell, it’s not always the food that makes the restaurant, but the atmosphere or nostalgia.    Restaurants are often looked at as a convenience -- a place to relax and have a good meal. However, I challenge this theory. Think about this: can you go to a restaurant and eat in your underwear and favorite pair of woolly socks? A little ridiculous, but the point is that you’re most comfortable in your own home. In addition, eating at home is more convenient, costs less and above all, it can be a lot healthier.    Serving Sizes  As Americans, we have become accustomed to and expect larger  portion sizes from restaurants. “I want my money’s worth,” and “We love coming here because the portion sizes are huge,” are the most common statements I hear when going to a restaurant. Most restaurants serve two to three times more than the healthy portion sizes recommended by the [place] Dietary Guidelines.    Not only is this not healthy, but most people don’t know what a proper portion size is. They tend to overeat and maybe “eat the whole thing.” We have become accustomed to expecting a filled to-go box to take home. You will notice the “made at home” portion sizes in the chart above are smaller and are the recommended serving size. Remember, proper serving sizes mean less calories consumed.    Savor the Flavor  Restaurants are in business to make money, and calorie-counting is not at the top of their list. Large chain restaurants have corporate  whose sole responsibility is to create mouth-watering, can’t-put-down food. Calories, fat, carbohydrates and many other nutrient values that are recommended are typically lost in the sea of making the tastiest dish with little regard for nutrition.    Two fried chicken patties used as a bun with cheese and do stuffed in the middle is being sold in a major chicken chain. Another chain sells an awesome Asian salad as far as taste goes, but with its toppings and salad dressing, it has more than 800 calories.    At a restaurant, you have almost no control over how most items are prepared, leaving your health and wellness in the hands of the  in the back. At home, you control how much salt is being used, what fat you use to cook with, the quality of the food product and most of all, you’re in control of your health and wellness.    Time Saving  “Eating at a restaurant saves me time,” is far from the truth. The average person does not want to spend more than 20 minutes to prepare a meal for their family. Choose a recipe or food item that requires the amount of time  you have to spend in the kitchen.    Plan ahead for days when you have kids’ soccer practice and you know a meal needs to be quick and nutritious (such as chicken Caesar salad). Then, on the days where life gives you more time, plan a pot roast with veggies where the prep time is 15 minutes and the cook time is three hours. As for the restaurant being quicker… if getting in the car and driving to the restaurant, waiting to be seated, waiting to order your food, waiting to get your food, paying for your meal and then driving home is quicker, then you might want to try a different recipe.    Take Responsibility  When all is said and done, you must take responsibility for your own health and wellness. Restaurants provide a great service, but in the end, you need to make decisions based on where you are in your weight management goals.    Why We Gain Weight When We’re Stressed--And How Not To  The psychology and biology of stress-related overeating and weight gain   Emotional Eating under Stress  Have you ever found yourself mindlessly eating a tub of ice cream while you brood about your latest romantic rejection or eating a hamburger and fries in front of your computer as you furiously try to make a work deadline? Perhaps you’re a busy mom, eating cookies in your car as you shuttle the kids back and forth to a slew of activities. Or you’re a small business owner desperately trying to make ends meet when you suddenly realize your waistline has expanded. If you recognize yourself in any of these scenarios, you’re not alone and it’s probably not your fault. Stress that goes on for a long period is a triple whammy for weight--it increases our appetites, makes us hold onto the fat, and interferes with our willpower to implement a healthy lifestyle.  Below are the four major reasons stress leads to weight gain and four great research-based coping strategies you can use to fight back.  Hormones  When your brain detects the  presence of a threat, no matter if it is a snake in the grass, a grumpy boss, or a big credit card bill, it triggers the release of a cascade of chemicals, including adrenaline, CRH, and cortisol. Your brain and body prepare to handle the threat by making you feel alert, ready for action and able to withstand an injury. In the short-term, adrenaline helps you feel less hungry as your blood flows away from the internal organs and to your large muscles to prepare for “fight or flight.” However, once the effects of adrenaline wear off, cortisol, known as the “stress hormone,” hangs around and starts signaling the body to replenish your food supply. Fighting off wild animals, like our ancestors did, used up a lot of energy, so their bodies needed more stores of fat and glucose. Today’s human, who sits on the couch worrying about how to pay the bill or works long hours at the computer to make the deadline, does not work off much energy at all dealing with the stressor! Unfortunately, we are stuck with a neuroendocrine system that didn’t get the update, so your brain is still going to tell you to reach for that plate of cookies anyway.  Belly Fat  In the days when our ancestors were fighting off tigers and famine, their bodies adapted by learning to store fat supplies for the long haul. The unfortunate result for you and me is that when we are chronically stressed by life crises and work-life demands, we are prone to getting an extra layer of “visceral fat” deep in our bellies. Your belly has an ample supply of blood vessels and cortisol receptors to make the whole process flow more efficiently. The downside is that excess belly fat is unhealthy and difficult to get rid of. The fat releases chemicals triggering inflammation, which increases the likelihood that we will develop heart disease or diabetes. And it can make it more difficult to fit into those jose jeans you splurged on, leading to more stress about money  wasted! Unfortunately, excess cortisol also slows down your metabolism, because your body wants to maintain an adequate supply of glucose for all that hard mental and physical work dealing with the threat.  Anxiety  When we have a surge of adrenaline as part of our fight/flight response, we get fidgety and activated. Adrenaline is the reason for the “wired up” feeling we get when we’re stressed. While we may burn off some extra calories fidgeting or running around cleaning because we can’t sit still, anxiety can also trigger “emotional eating.” Overeating or eating unhealthy foods in response to stress or as a way to calm down is a very common response. In the most recent American Psychological Association’s “Stress in [place]:” survey, a whopping 40% of respondents reported dealing with stress in this way, while 42% reported watching television for more than 2 hours a day to deal with stress. Being a couch potato also increases the temptation to overeat and is inactive, which means that those extra calories aren’t getting burned off. Anxiety can also make you eat more “mindlessly” as you churn around worrying thoughts in your head, not even focusing on the taste of the food, how much you’ve eaten, or when you are feeling full. When you eat mindlessly, you will likely eat more, yet feel less satisfied.  Cravings and Fast Food  When we are chronically stressed, we crave “comfort foods,” such as a bag of potato chips or a tub of ice cream. These foods tend to be easy to eat, highly processed, and high in fat, sugar, or salt. We crave these foods for both biological and psychological reasons. Stress may mess up our brain’s reward system or cortisol may cause us to crave more fat and sugar. We also may have memories from childhood, such as the smell of freshly baked cookies,, that lead us to associate sweet foods with comfort. When we are stressed, we also may be more likely to drive through the Fast Food place, rather  than taking the time and mental energy to plan and cook a meal. Americans are less likely to cook and eat dinner at home than people from many other countries, and they also work more hours. Working in urban areas may mean long, jammed commutes, which both increase stress and interfere with willpower because we are hungrier when we get home later. A [facility] research study showed, in laboratory mice, that being “stressed” by exposure to the smell of a predator lead the mice to eat more high-fat food pellets, when given the choice of eating these instead of normal feed.  Less Sleep  Do you ever lie awake at night worrying about paying the bills or about who will watch your kids when you have to go to work? According to the APA’s “Stress in [place]” survey, more than 40% of us lie awake at night as a result of stress. Research shows that worry is a major cause of insomnia. Our minds are overactive and won’t switch off. We may also lose sleep because of pulling overnights to cram for exams or writing until the early hours. Stress causes decreased blood sugar, which leads to fatigue. If you drink coffee or caffeinated soft drinks to stay awake, or alcohol to feel better, your sleep cycle will be even more disrupted. Sleep is also a powerful factor influencing weight gain or loss. Lack of sleep may disrupt the functioning of ghrelin and leptin--chemicals that control appetite. We also crave carbs when we are tired or grumpy from lack of sleep. Finally, not getting our jennifer zzzz’s erodes our willpower and ability to resist temptation. In one study, overweight/obese dieters were asked to follow a fixed calorie diet and assigned to get either 5 and a half or eight and a half hours of sleep a night (in a sleep lab). Those with sleep deprivation lost substantially less weight.  How to Minimize Weight Gain When You’re Stressed  Exercise  Aerobic exercise has a one-two punch. It can decrease cortisol and  trigger release of chemicals that relieve pain and improve mood. It can also help speed your metabolism so you burn off the extra indulgences  Eat Mindfully  Mindful Eating programs train you in meditation, which helps you cope with stress, and change your consciousness around eating. You learn to slow down and tune in to your sensory experience of the food, including its sight, texture or smell. You also learn to tune into your subjective feelings of hunger or fullness, rather than eating just because it’s a mealtime or because there is food in front of you. A well-designed study of binge-eaters showed that participating in a Mindful Eating program led to fewer binges and reduced depression.  Find Rewarding Activities Unrelated to Food  Taking a hike, reading a book, going to a yoga class, getting a massage, patting your dog, or making time for friends and family can help to relieve stress without adding on the pounds. Although you may feel that you don’t have time for leisure activities with looming deadlines, taking time to relieve stress helps you to feel refreshed, lets you think more clearly, and improves your mood, so you are less likely to overeat.  Write in a Journal  Writing down your experiences and reactions or your most important goals keeps your hands busy and your mind occupied, so you’re less likely to snack on unhealthy foods. Writing can give you insight into why you’re feeling so stressed and highlight ways of thinking or expectations of yourself that may be increasing the pressure you feel. Writing down your healthy eating and exercise goals may make you more conscious of your desire to live a healthier lifestyle and intensify your commitment. Research studies have also shown that writing expressively or about life goals can improve both mood and health.    Source: Psychology Today  Date: Posted Aug 28, 2013   Author: Zayra Trimble, Ph.D        Return in about 4 months (around 8/2/2025) for  weight management via clinic or Telemedicine Visit and in clinic in October.    Patient verbalizes understanding.    Adrienne CarrascoDIMITRIS gurrola  4/2/2025    DOCUMENTATION OF TIME SPENT: Code selection for this visit was based on time spent : 55 minutes on date of service in preparing to see the patient, obtaining and/or reviewing separately obtained history, performing a medically appropriate examination, counseling and educating the patient/family/caregiver, ordering medications or testing, referring and communicating with other healthcare providers, documenting clinical information in the electronic medical record, independently interpreting results and communicating results to the patient/family/caregiver and care coordination with the patient's other providers.         [1]   Current Outpatient Medications on File Prior to Visit   Medication Sig Dispense Refill    benzonatate 100 MG Oral Cap Take 1 capsule (100 mg total) by mouth 3 (three) times daily as needed.      Cranberry 250 MG Oral Cap       loratadine-pseudoephedrine ER (ALLERGY RELIEF D12) 5-120 MG Oral Tablet 12 Hr       fluticasone propionate 50 MCG/ACT Nasal Suspension by Nasal route As Directed.      Glucosamine-Chondroit-Vit C-Mn (GLUCOSAMINE CHONDR 1500 COMPLX OR) Take 2 tablets by mouth daily.        Multiple Vitamin (DAILY VALUE MULTIVITAMIN) Oral Tab Take 1 tablet by mouth daily.      nitrofurantoin monohydrate macro 100 MG Oral Cap TAKE ONE CAPSULE BY MOUTH PRIOR TO INTERCOURSE (Patient not taking: Reported on 4/2/2025)      fluconazole 150 MG Oral Tab Take 1 tablet (150 mg total) by mouth every 3 (three) days.      lisinopril 20 MG Oral Tab Take 1 tablet (20 mg total) by mouth daily. 90 tablet 1     No current facility-administered medications on file prior to visit.

## 2025-04-12 DIAGNOSIS — I10 ESSENTIAL HYPERTENSION: ICD-10-CM

## 2025-04-14 ENCOUNTER — TELEPHONE (OUTPATIENT)
Dept: FAMILY MEDICINE CLINIC | Facility: CLINIC | Age: 65
End: 2025-04-14

## 2025-04-14 RX ORDER — LISINOPRIL 20 MG/1
20 TABLET ORAL DAILY
Qty: 90 TABLET | Refills: 1 | Status: SHIPPED | OUTPATIENT
Start: 2025-04-14

## 2025-04-14 NOTE — TELEPHONE ENCOUNTER
Patient scheduled her annual physical for 6/24/25. Requesting refills to hold her over.    lisinopril 20 MG Oral Tab  [facility]      Please advise.

## 2025-04-14 NOTE — TELEPHONE ENCOUNTER
Appointment scheduled on 6/24 for annual    Requested Prescriptions     Signed Prescriptions Disp Refills    LISINOPRIL 20 MG Oral Tab 90 tablet 1     Sig: TAKE 1 TABLET(20 MG) BY MOUTH DAILY     Authorizing Provider: BARBARA VILLANUEVA     Ordering User: STIVEN BARBOSA        Last refill: 1/14/25    Last Appointment: LOV Visit date not found     Next Appointment: 6/24/2025 Barbara Villanueva, DO

## 2025-05-02 ENCOUNTER — HOSPITAL ENCOUNTER (OUTPATIENT)
Dept: MAMMOGRAPHY | Facility: HOSPITAL | Age: 65
Discharge: HOME | End: 2025-05-02
Attending: OBSTETRICS & GYNECOLOGY
Payer: COMMERCIAL

## 2025-05-02 DIAGNOSIS — Z12.31 ENCOUNTER FOR SCREENING MAMMOGRAM FOR MALIGNANT NEOPLASM OF BREAST: ICD-10-CM

## 2025-05-02 PROCEDURE — 77063 BREAST TOMOSYNTHESIS BI: CPT | Performed by: OBSTETRICS & GYNECOLOGY

## 2025-05-02 PROCEDURE — 77067 SCR MAMMO BI INCL CAD: CPT | Performed by: OBSTETRICS & GYNECOLOGY

## 2025-07-03 ENCOUNTER — TELEPHONE (OUTPATIENT)
Dept: FAMILY MEDICINE CLINIC | Facility: CLINIC | Age: 65
End: 2025-07-03

## 2025-07-03 DIAGNOSIS — Z00.00 WELL WOMAN EXAM WITHOUT GYNECOLOGICAL EXAM: ICD-10-CM

## 2025-07-03 NOTE — TELEPHONE ENCOUNTER
Please enter lab orders for the patient's upcoming physical appointment.     Physical scheduled:   Your appointments       Date & Time Appointment Department (Organ)    Jul 31, 2025 8:20 AM CDT Physical - Established with Barbara Villanueva DO [facility], [facility] ([facility])              [facility]  [address] [place] [address] [place] [id number]  [phone]           Preferred lab: [facility] ([facility])     The patient has been notified to complete fasting labs prior to their physical appointment.

## 2025-07-12 ENCOUNTER — PATIENT OUTREACH (OUTPATIENT)
Dept: FAMILY MEDICINE CLINIC | Facility: CLINIC | Age: 65
End: 2025-07-12

## 2025-07-17 ENCOUNTER — LAB ENCOUNTER (OUTPATIENT)
Dept: LAB | Facility: HOSPITAL | Age: 65
End: 2025-07-17
Attending: FAMILY MEDICINE
Payer: COMMERCIAL

## 2025-07-17 DIAGNOSIS — F43.9 STRESS: ICD-10-CM

## 2025-07-17 DIAGNOSIS — Z78.0 MENOPAUSE: ICD-10-CM

## 2025-07-17 DIAGNOSIS — Z00.00 WELL WOMAN EXAM WITHOUT GYNECOLOGICAL EXAM: ICD-10-CM

## 2025-07-17 DIAGNOSIS — R63.5 WEIGHT GAIN: ICD-10-CM

## 2025-07-17 DIAGNOSIS — Z51.81 ENCOUNTER FOR THERAPEUTIC DRUG MONITORING: ICD-10-CM

## 2025-07-17 DIAGNOSIS — I10 ESSENTIAL HYPERTENSION: ICD-10-CM

## 2025-07-17 DIAGNOSIS — E66.3 OVERWEIGHT (BMI 25.0-29.9): ICD-10-CM

## 2025-07-17 LAB
ALBUMIN SERPL-MCNC: 4.2 G/DL (ref 3.2–4.8)
ALBUMIN/GLOB SERPL: 1.6 {RATIO} (ref 1–2)
ALP LIVER SERPL-CCNC: 88 U/L (ref 50–130)
ALT SERPL-CCNC: 19 U/L (ref 10–49)
ANION GAP SERPL CALC-SCNC: 5 MMOL/L (ref 0–18)
AST SERPL-CCNC: 26 U/L (ref ?–34)
BASOPHILS # BLD AUTO: 0.04 X10(3) UL (ref 0–0.2)
BASOPHILS NFR BLD AUTO: 0.9 %
BILIRUB SERPL-MCNC: 0.4 MG/DL (ref 0.2–1.1)
BUN BLD-MCNC: 16 MG/DL (ref 9–23)
CALCIUM BLD-MCNC: 10.1 MG/DL (ref 8.7–10.6)
CHLORIDE SERPL-SCNC: 106 MMOL/L (ref 98–112)
CHOLEST SERPL-MCNC: 185 MG/DL (ref ?–200)
CO2 SERPL-SCNC: 30 MMOL/L (ref 21–32)
CREAT BLD-MCNC: 0.96 MG/DL (ref 0.55–1.02)
EGFRCR SERPLBLD CKD-EPI 2021: 67 ML/MIN/1.73M2 (ref 60–?)
EOSINOPHIL # BLD AUTO: 0.14 X10(3) UL (ref 0–0.7)
EOSINOPHIL NFR BLD AUTO: 3 %
ERYTHROCYTE [DISTWIDTH] IN BLOOD BY AUTOMATED COUNT: 12.2 %
EST. AVERAGE GLUCOSE BLD GHB EST-MCNC: 108 MG/DL (ref 68–126)
FASTING PATIENT LIPID ANSWER: YES
FASTING STATUS PATIENT QL REPORTED: YES
GLOBULIN PLAS-MCNC: 2.6 G/DL (ref 2–3.5)
GLUCOSE BLD-MCNC: 95 MG/DL (ref 70–99)
HBA1C MFR BLD: 5.4 % (ref ?–5.7)
HCT VFR BLD AUTO: 38.5 % (ref 35–48)
HDLC SERPL-MCNC: 55 MG/DL (ref 40–59)
HGB BLD-MCNC: 12.9 G/DL (ref 12–16)
IMM GRANULOCYTES # BLD AUTO: 0.01 X10(3) UL (ref 0–1)
IMM GRANULOCYTES NFR BLD: 0.2 %
LDLC SERPL CALC-MCNC: 115 MG/DL (ref ?–100)
LYMPHOCYTES # BLD AUTO: 1.58 X10(3) UL (ref 1–4)
LYMPHOCYTES NFR BLD AUTO: 34.3 %
MCH RBC QN AUTO: 30.7 PG (ref 26–34)
MCHC RBC AUTO-ENTMCNC: 33.5 G/DL (ref 31–37)
MCV RBC AUTO: 91.7 FL (ref 80–100)
MONOCYTES # BLD AUTO: 0.41 X10(3) UL (ref 0.1–1)
MONOCYTES NFR BLD AUTO: 8.9 %
NEUTROPHILS # BLD AUTO: 2.43 X10 (3) UL (ref 1.5–7.7)
NEUTROPHILS # BLD AUTO: 2.43 X10(3) UL (ref 1.5–7.7)
NEUTROPHILS NFR BLD AUTO: 52.7 %
NONHDLC SERPL-MCNC: 130 MG/DL (ref ?–130)
OSMOLALITY SERPL CALC.SUM OF ELEC: 293 MOSM/KG (ref 275–295)
PLATELET # BLD AUTO: 189 10(3)UL (ref 150–450)
POTASSIUM SERPL-SCNC: 4.4 MMOL/L (ref 3.5–5.1)
PROT SERPL-MCNC: 6.8 G/DL (ref 5.7–8.2)
RBC # BLD AUTO: 4.2 X10(6)UL (ref 3.8–5.3)
SODIUM SERPL-SCNC: 141 MMOL/L (ref 136–145)
TRIGL SERPL-MCNC: 79 MG/DL (ref 30–149)
TSI SER-ACNC: 3.15 UIU/ML (ref 0.55–4.78)
VIT B12 SERPL-MCNC: 616 PG/ML (ref 211–911)
VIT D+METAB SERPL-MCNC: 36.9 NG/ML (ref 30–100)
VLDLC SERPL CALC-MCNC: 14 MG/DL (ref 0–30)
WBC # BLD AUTO: 4.6 X10(3) UL (ref 4–11)

## 2025-07-17 PROCEDURE — 80053 COMPREHEN METABOLIC PANEL: CPT

## 2025-07-17 PROCEDURE — 36415 COLL VENOUS BLD VENIPUNCTURE: CPT

## 2025-07-17 PROCEDURE — 80061 LIPID PANEL: CPT

## 2025-07-17 PROCEDURE — 84443 ASSAY THYROID STIM HORMONE: CPT

## 2025-07-17 PROCEDURE — 83036 HEMOGLOBIN GLYCOSYLATED A1C: CPT

## 2025-07-17 PROCEDURE — 82306 VITAMIN D 25 HYDROXY: CPT

## 2025-07-17 PROCEDURE — 82607 VITAMIN B-12: CPT

## 2025-07-17 PROCEDURE — 85025 COMPLETE CBC W/AUTO DIFF WBC: CPT

## 2025-07-31 ENCOUNTER — OFFICE VISIT (OUTPATIENT)
Dept: FAMILY MEDICINE CLINIC | Facility: CLINIC | Age: 65
End: 2025-07-31
Payer: COMMERCIAL

## 2025-07-31 VITALS
HEART RATE: 73 BPM | BODY MASS INDEX: 27.83 KG/M2 | OXYGEN SATURATION: 97 % | DIASTOLIC BLOOD PRESSURE: 64 MMHG | SYSTOLIC BLOOD PRESSURE: 120 MMHG | RESPIRATION RATE: 18 BRPM | WEIGHT: 165 LBS | HEIGHT: 64.57 IN

## 2025-07-31 DIAGNOSIS — Z23 NEED FOR VACCINATION: ICD-10-CM

## 2025-07-31 DIAGNOSIS — J30.89 NON-SEASONAL ALLERGIC RHINITIS, UNSPECIFIED TRIGGER: ICD-10-CM

## 2025-07-31 DIAGNOSIS — E66.3 OVERWEIGHT (BMI 25.0-29.9): ICD-10-CM

## 2025-07-31 DIAGNOSIS — Z00.00 WELL WOMAN EXAM WITHOUT GYNECOLOGICAL EXAM: Primary | ICD-10-CM

## 2025-07-31 DIAGNOSIS — I10 ESSENTIAL HYPERTENSION: ICD-10-CM

## 2025-07-31 PROCEDURE — 99396 PREV VISIT EST AGE 40-64: CPT | Performed by: FAMILY MEDICINE

## 2025-07-31 PROCEDURE — 90471 IMMUNIZATION ADMIN: CPT | Performed by: FAMILY MEDICINE

## 2025-07-31 PROCEDURE — 90677 PCV20 VACCINE IM: CPT | Performed by: FAMILY MEDICINE

## 2025-08-26 ENCOUNTER — TELEMEDICINE (OUTPATIENT)
Dept: INTERNAL MEDICINE CLINIC | Facility: CLINIC | Age: 65
End: 2025-08-26

## 2025-08-26 DIAGNOSIS — I10 ESSENTIAL HYPERTENSION: ICD-10-CM

## 2025-08-26 DIAGNOSIS — E66.3 OVERWEIGHT (BMI 25.0-29.9): ICD-10-CM

## 2025-08-26 DIAGNOSIS — Z51.81 ENCOUNTER FOR THERAPEUTIC DRUG MONITORING: Primary | ICD-10-CM

## 2025-08-26 DIAGNOSIS — Z78.0 MENOPAUSE: ICD-10-CM

## 2025-08-26 PROCEDURE — 98006 SYNCH AUDIO-VIDEO EST MOD 30: CPT | Performed by: NURSE PRACTITIONER

## 2025-08-26 RX ORDER — TIRZEPATIDE 2.5 MG/.5ML
2.5 INJECTION, SOLUTION SUBCUTANEOUS WEEKLY
Qty: 2 ML | Refills: 2 | Status: SHIPPED | OUTPATIENT
Start: 2025-08-26

## (undated) NOTE — MR AVS SNAPSHOT
[address] [facility] [address]  [facility],  [address] [facility] [phone]               Thank you for choosing us for your health care visit with Eduin Brannon PA-C.   We are glad to serve you and happy to provide you with [facility] If you've recently had a stay at the Hospital you can access your discharge instructions in Surveypal by going to Visits < Admission Summaries.  If you've been to the Emergency Department or your doctor's office, you can view your past visit information in My Visit [facility] online at  [email or web address]